# Patient Record
Sex: FEMALE | Race: WHITE | Employment: UNEMPLOYED | URBAN - METROPOLITAN AREA
[De-identification: names, ages, dates, MRNs, and addresses within clinical notes are randomized per-mention and may not be internally consistent; named-entity substitution may affect disease eponyms.]

---

## 2024-08-13 ENCOUNTER — OFFICE VISIT (OUTPATIENT)
Dept: INTERNAL MEDICINE CLINIC | Facility: CLINIC | Age: 69
End: 2024-08-13
Payer: COMMERCIAL

## 2024-08-13 VITALS
DIASTOLIC BLOOD PRESSURE: 72 MMHG | WEIGHT: 152 LBS | OXYGEN SATURATION: 98 % | SYSTOLIC BLOOD PRESSURE: 112 MMHG | HEART RATE: 80 BPM | HEIGHT: 60 IN | BODY MASS INDEX: 29.84 KG/M2

## 2024-08-13 DIAGNOSIS — Z82.61 FAMILY HISTORY OF RHEUMATOID ARTHRITIS: ICD-10-CM

## 2024-08-13 DIAGNOSIS — M25.50 POLYARTHRALGIA: ICD-10-CM

## 2024-08-13 DIAGNOSIS — Z12.31 SCREENING MAMMOGRAM, ENCOUNTER FOR: ICD-10-CM

## 2024-08-13 DIAGNOSIS — M19.049 HAND ARTHRITIS: ICD-10-CM

## 2024-08-13 DIAGNOSIS — Z98.61 CAD S/P PERCUTANEOUS CORONARY ANGIOPLASTY: ICD-10-CM

## 2024-08-13 DIAGNOSIS — Z78.0 ASYMPTOMATIC MENOPAUSE: ICD-10-CM

## 2024-08-13 DIAGNOSIS — M54.9 BACK PAIN, UNSPECIFIED BACK LOCATION, UNSPECIFIED BACK PAIN LATERALITY, UNSPECIFIED CHRONICITY: ICD-10-CM

## 2024-08-13 DIAGNOSIS — Z12.11 SCREENING FOR COLON CANCER: ICD-10-CM

## 2024-08-13 DIAGNOSIS — Z80.0 FAMILY HISTORY OF COLON CANCER: ICD-10-CM

## 2024-08-13 DIAGNOSIS — Z12.39 ENCOUNTER FOR BREAST CANCER SCREENING OTHER THAN MAMMOGRAM: ICD-10-CM

## 2024-08-13 DIAGNOSIS — Z00.00 ANNUAL PHYSICAL EXAM: Primary | ICD-10-CM

## 2024-08-13 DIAGNOSIS — I25.10 CAD S/P PERCUTANEOUS CORONARY ANGIOPLASTY: ICD-10-CM

## 2024-08-13 DIAGNOSIS — Z23 NEED FOR VACCINATION: ICD-10-CM

## 2024-08-13 DIAGNOSIS — Z12.39 ENCOUNTER FOR SCREENING FOR MALIGNANT NEOPLASM OF BREAST, UNSPECIFIED SCREENING MODALITY: ICD-10-CM

## 2024-08-13 DIAGNOSIS — J45.20 MILD INTERMITTENT ASTHMA WITHOUT COMPLICATION (HCC): ICD-10-CM

## 2024-08-13 PROCEDURE — 99387 INIT PM E/M NEW PAT 65+ YRS: CPT | Performed by: INTERNAL MEDICINE

## 2024-08-13 PROCEDURE — 90471 IMMUNIZATION ADMIN: CPT | Performed by: INTERNAL MEDICINE

## 2024-08-13 PROCEDURE — 3074F SYST BP LT 130 MM HG: CPT | Performed by: INTERNAL MEDICINE

## 2024-08-13 PROCEDURE — 3008F BODY MASS INDEX DOCD: CPT | Performed by: INTERNAL MEDICINE

## 2024-08-13 PROCEDURE — 3078F DIAST BP <80 MM HG: CPT | Performed by: INTERNAL MEDICINE

## 2024-08-13 PROCEDURE — 90750 HZV VACC RECOMBINANT IM: CPT | Performed by: INTERNAL MEDICINE

## 2024-08-13 RX ORDER — CLOPIDOGREL BISULFATE 75 MG/1
75 TABLET ORAL DAILY
COMMUNITY
Start: 2024-05-28 | End: 2024-08-15

## 2024-08-13 RX ORDER — MONTELUKAST SODIUM 10 MG/1
10 TABLET ORAL NIGHTLY
COMMUNITY
End: 2024-08-15

## 2024-08-13 RX ORDER — DESLORATADINE 5 MG/1
5 TABLET ORAL DAILY
COMMUNITY

## 2024-08-13 RX ORDER — DILTIAZEM HYDROCHLORIDE 60 MG/1
60 CAPSULE, EXTENDED RELEASE ORAL 2 TIMES DAILY
COMMUNITY

## 2024-08-13 RX ORDER — ROSUVASTATIN CALCIUM 20 MG/1
20 TABLET, COATED ORAL DAILY
COMMUNITY
End: 2024-08-15

## 2024-08-13 RX ORDER — IBUPROFEN 100 MG/5ML
1 SUSPENSION ORAL ONCE
Qty: 1 KIT | Refills: 2 | Status: SHIPPED | OUTPATIENT
Start: 2024-08-13 | End: 2024-08-13

## 2024-08-13 RX ORDER — ASPIRIN 81 MG/1
81 TABLET ORAL DAILY
COMMUNITY
Start: 2022-12-05 | End: 2024-08-15

## 2024-08-13 RX ORDER — ALBUTEROL SULFATE 90 UG/1
1 AEROSOL, METERED RESPIRATORY (INHALATION) EVERY 4 HOURS PRN
COMMUNITY

## 2024-08-13 NOTE — PROGRESS NOTES
Terrell Heard is a 68 year old female.    Chief complaint: annual physical exam       HPI:     Terrell Heard is a 68 year old pleasant female who presents for annual physical exam     Asthma   Albuterol   Trellegy   Also on Singulair   Asthma is under control   Not needing albuterol             CAD s/p angioplasty   May 2024   On aspirin, plavix and statin   Managed by the cardiologist Dr Martinez       Her sister has RA   Recently started having hand pain   Stiffness   Changes in the shape of her hands   Left is worse than the right   Swelling in the joints   Lower back pain   Djd   Did see Dr lopes   S/p injections x 3   S/p PT   Pain is on and off   When bad It is 9/10         Left  second toe   Bending   Painful       Last pap smear 2020  Did see OB gyne last year for postmenopause bleeding s/p endometrial biopsy         S/p colonscopy 2020           Family history of cancer : colon cancer   No smoking   No alcohol         Current Outpatient Medications   Medication Sig Dispense Refill    aspirin 81 MG Oral Tab EC Take 1 tablet (81 mg total) by mouth daily.      clopidogrel 75 MG Oral Tab Take 1 tablet (75 mg total) by mouth daily.      GLUCOSAMINE-VITAMIN D OR Glucosamine-Vitamin D 1000-200 MG-UNIT Oral Tablet RxNorm: 138198 Glucosamine-Vitamin D 1000-200 MG-UNIT 2022-12-05 Not Indicated   Dr Yoshi Martinez  [facility]  12/12/2022      rosuvastatin 20 MG Oral Tab Take 1 tablet (20 mg total) by mouth daily.      montelukast 10 MG Oral Tab Take 1 tablet (10 mg total) by mouth nightly.      desloratadine 5 MG Oral Tab Take 1 tablet (5 mg total) by mouth daily.      dilTIAZem HCl ER 60 MG Oral Capsule SR 12 Hr Take 1 capsule (60 mg total) by mouth 2 (two) times daily.      albuterol 108 (90 Base) MCG/ACT Inhalation Aero Soln Inhale 1 puff into the lungs every 4 (four) hours as needed.        Past Medical History:    Asthma (HCC)     Past Surgical History:   Procedure  Laterality Date    Angioplasty (coronary)  2024             Family History   Problem Relation Age of Onset    Stroke Father     Hypertension Father     Diabetes Mother     Cancer Sister         colon cancer     There is no problem list on file for this patient.      REVIEW OF SYSTEMS:   A comprehensive 10 point review of systems was completed.  Pertinent positives and negatives noted in the the HPI            EXAM:   /72   Pulse 80   Ht 5' (1.524 m)   Wt 152 lb (68.9 kg)   SpO2 98%   BMI 29.69 kg/m²   GENERAL: well developed, well nourished,in no apparent distress  SKIN: no rashes,no suspicious lesions  HEENT: atraumatic, normocephalic,ears and throat are clear  NECK: supple,no adenopathy  LUNGS: clear to auscultation  Breast : normal no lumps   CARDIO: RRR without murmur  GI: no masses, HSM or tenderness  EXTREMITIES: arthritis changes, deviation of the hand   Left second toe : hammer toe   Bilateral bunions   NEURO: no gross deficits              Orders Placed This Encounter    aspirin 81 MG Oral Tab EC     Sig: Take 1 tablet (81 mg total) by mouth daily.    clopidogrel 75 MG Oral Tab     Sig: Take 1 tablet (75 mg total) by mouth daily.    rosuvastatin 20 MG Oral Tab     Sig: Take 1 tablet (20 mg total) by mouth daily.    montelukast 10 MG Oral Tab     Sig: Take 1 tablet (10 mg total) by mouth nightly.    GLUCOSAMINE-VITAMIN D OR     Sig: Glucosamine-Vitamin D 1000-200 MG-UNIT Oral Tablet RxNorm: 137146 Glucosamine-Vitamin D 1000-200 MG-UNIT 2022-12-05 Not Indicated   Dr Yoshi Martinez  [facility]  12/12/2022    desloratadine 5 MG Oral Tab     Sig: Take 1 tablet (5 mg total) by mouth daily.    dilTIAZem HCl ER 60 MG Oral Capsule SR 12 Hr     Sig: Take 1 capsule (60 mg total) by mouth 2 (two) times daily.    albuterol 108 (90 Base) MCG/ACT Inhalation Aero Soln     Sig: Inhale 1 puff into the lungs every 4 (four) hours as needed.     No results found.         ASSESSMENT  AND PLAN:       ICD-10-CM    1. Annual physical exam  Z00.00 aspirin 81 MG Oral Tab EC     clopidogrel 75 MG Oral Tab     rosuvastatin 20 MG Oral Tab     montelukast 10 MG Oral Tab     GLUCOSAMINE-VITAMIN D OR     desloratadine 5 MG Oral Tab     dilTIAZem HCl ER 60 MG Oral Capsule SR 12 Hr     albuterol 108 (90 Base) MCG/ACT Inhalation Aero Soln     EDGAR RICARDO 2D+3D SCREENING BILAT (CPT=77067/76717)     Gastro Referral - In Network     XR DEXA BONE DENSITOMETRY (CPT=77080)     Zoster Recombinant Adjuvanted (Shingrix -Shingles) [39010]     CBC With Differential With Platelet     Blood Glucose Monitoring Suppl (CONTOUR NEXT GEN MONITOR) w/Device Does not apply Kit     Hemoglobin A1C     Lipid Panel     TSH W Reflex To Free T4     Vitamin B12     Folic Acid Serum (Folate)     Vitamin D     C-Reactive Protein     Cyclic Citrullinate Pep. IGG     Rheumatoid Arthritis Factor     Sed Rate, Westergren (Automated)     Connective Tissue Disease (MEL) Screen, Reflex Specific Antibody     Podiatry Referral - In Network     HLA B 27 Disease Association [E]     Physical Therapy Referral - [facility]     Rheumatology Referral - In Network     Urinalysis with Culture Reflex [E]     Comp Metabolic Panel (14) [E]      2. Encounter for screening for malignant neoplasm of breast, unspecified screening modality  Z12.39 Gastro Referral - In Network     CBC With Differential With Platelet     Blood Glucose Monitoring Suppl (CONTOUR NEXT GEN MONITOR) w/Device Does not apply Kit     Hemoglobin A1C     Lipid Panel     TSH W Reflex To Free T4     Vitamin B12     Folic Acid Serum (Folate)     Vitamin D     C-Reactive Protein     Cyclic Citrullinate Pep. IGG     Rheumatoid Arthritis Factor     Sed Rate, Westergren (Automated)     Connective Tissue Disease (MEL) Screen, Reflex Specific Antibody     Podiatry Referral - In Network     HLA B 27 Disease Association [E]     Physical Therapy Referral - [facility]     Rheumatology  Referral - In Network     Urinalysis with Culture Reflex [E]     Comp Metabolic Panel (14) [E]      3. Encounter for breast cancer screening other than mammogram  Z12.39 EDGAR RICARDO 2D+3D SCREENING BILAT (CPT=77067/90999)     CBC With Differential With Platelet     Blood Glucose Monitoring Suppl (CONTOUR NEXT GEN MONITOR) w/Device Does not apply Kit     Hemoglobin A1C     Lipid Panel     TSH W Reflex To Free T4     Vitamin B12     Folic Acid Serum (Folate)     Vitamin D     C-Reactive Protein     Cyclic Citrullinate Pep. IGG     Rheumatoid Arthritis Factor     Sed Rate, Westergren (Automated)     Connective Tissue Disease (MEL) Screen, Reflex Specific Antibody     Podiatry Referral - In Network     HLA B 27 Disease Association [E]     Physical Therapy Referral - [facility]     Rheumatology Referral - In [facility]     Urinalysis with Culture Reflex [E]     Comp Metabolic Panel (14) [E]      4. Screening mammogram, encounter for  Z12.31 EDGAR RICARDO 2D+3D SCREENING BILAT (CPT=77067/73529)     CBC With Differential With Platelet     Blood Glucose Monitoring Suppl (CONTOUR NEXT GEN MONITOR) w/Device Does not apply Kit     Hemoglobin A1C     Lipid Panel     TSH W Reflex To Free T4     Vitamin B12     Folic Acid Serum (Folate)     Vitamin D     C-Reactive Protein     Cyclic Citrullinate Pep. IGG     Rheumatoid Arthritis Factor     Sed Rate, Westergren (Automated)     Connective Tissue Disease (MEL) Screen, Reflex Specific Antibody     Podiatry Referral - In Network     HLA B 27 Disease Association [E]     Physical Therapy Referral - [facility]     Rheumatology Referral - In Network     Urinalysis with Culture Reflex [E]     Comp Metabolic Panel (14) [E]      5. Asymptomatic menopause  Z78.0 XR DEXA BONE DENSITOMETRY (CPT=77080)     CBC With Differential With Platelet     Blood Glucose Monitoring Suppl (CONTOUR NEXT GEN MONITOR) w/Device Does not apply Kit     Hemoglobin A1C     Lipid Panel     TSH W Reflex To Free  T4     Vitamin B12     Folic Acid Serum (Folate)     Vitamin D     C-Reactive Protein     Cyclic Citrullinate Pep. IGG     Rheumatoid Arthritis Factor     Sed Rate, Westergren (Automated)     Connective Tissue Disease (MEL) Screen, Reflex Specific Antibody     Podiatry Referral - In Network     HLA B 27 Disease Association [E]     Physical Therapy Referral - [facility]     Rheumatology Referral - In [facility]     Urinalysis with Culture Reflex [E]     Comp Metabolic Panel (14) [E]      6. Polyarthralgia  M25.50 CBC With Differential With Platelet     Blood Glucose Monitoring Suppl (CONTOUR NEXT GEN MONITOR) w/Device Does not apply Kit     Hemoglobin A1C     Lipid Panel     TSH W Reflex To Free T4     Vitamin B12     Folic Acid Serum (Folate)     Vitamin D     C-Reactive Protein     Cyclic Citrullinate Pep. IGG     Rheumatoid Arthritis Factor     Sed Rate, Westergren (Automated)     Connective Tissue Disease (MEL) Screen, Reflex Specific Antibody     Podiatry Referral - In [facility] 27 Disease Association [E]     Physical Therapy Referral - [facility]     Rheumatology Referral - In [facility]     Urinalysis with Culture Reflex [E]     Comp Metabolic Panel (14) [E]      7. Hand arthritis  M19.049 CBC With Differential With Platelet     Blood Glucose Monitoring Suppl (CONTOUR NEXT GEN MONITOR) w/Device Does not apply Kit     Hemoglobin A1C     Lipid Panel     TSH W Reflex To Free T4     Vitamin B12     Folic Acid Serum (Folate)     Vitamin D     C-Reactive Protein     Cyclic Citrullinate Pep. IGG     Rheumatoid Arthritis Factor     Sed Rate, Westergren (Automated)     Connective Tissue Disease (MEL) Screen, Reflex Specific Antibody     Podiatry Referral - In [facility] B 27 Disease Association [E]     Physical Therapy Referral - [facility]     Rheumatology Referral - In [facility]     Urinalysis with Culture Reflex [E]     Comp Metabolic Panel (14) [E]      8. CAD S/P percutaneous coronary  angioplasty  I25.10 CBC With Differential With Platelet    Z98.61 Blood Glucose Monitoring Suppl (CONTOUR NEXT GEN MONITOR) w/Device Does not apply Kit     Hemoglobin A1C     Lipid Panel     TSH W Reflex To Free T4     Vitamin B12     Folic Acid Serum (Folate)     Vitamin D     C-Reactive Protein     Cyclic Citrullinate Pep. IGG     Rheumatoid Arthritis Factor     Sed Rate, Westergren (Automated)     Connective Tissue Disease (MEL) Screen, Reflex Specific Antibody     Podiatry Referral - In Network     HLA B 27 Disease Association [E]     Physical Therapy Referral - [facility]     Rheumatology Referral - In Network     Urinalysis with Culture Reflex [E]     Comp Metabolic Panel (14) [E]      9. Family history of colon cancer  Z80.0 Gastro Referral - In Network     CBC With Differential With Platelet     Blood Glucose Monitoring Suppl (CONTOUR NEXT GEN MONITOR) w/Device Does not apply Kit     Hemoglobin A1C     Lipid Panel     TSH W Reflex To Free T4     Vitamin B12     Folic Acid Serum (Folate)     Vitamin D     C-Reactive Protein     Cyclic Citrullinate Pep. IGG     Rheumatoid Arthritis Factor     Sed Rate, Westergren (Automated)     Connective Tissue Disease (MEL) Screen, Reflex Specific Antibody     Podiatry Referral - In Network     HLA B 27 Disease Association [E]     Physical Therapy Referral - [facility]     Rheumatology Referral - In Network     Urinalysis with Culture Reflex [E]     Comp Metabolic Panel (14) [E]      10. Family history of rheumatoid arthritis  Z82.61 CBC With Differential With Platelet     Blood Glucose Monitoring Suppl (CONTOUR NEXT GEN MONITOR) w/Device Does not apply Kit     Hemoglobin A1C     Lipid Panel     TSH W Reflex To Free T4     Vitamin B12     Folic Acid Serum (Folate)     Vitamin D     C-Reactive Protein     Cyclic Citrullinate Pep. IGG     Rheumatoid Arthritis Factor     Sed Rate, Westergren (Automated)     Connective Tissue Disease (MEL) Screen, Reflex  Specific Antibody     Podiatry Referral - In Network     HLA B 27 Disease Association [E]     Physical Therapy Referral - [facility]     Rheumatology Referral - In Network     Urinalysis with Culture Reflex [E]     Comp Metabolic Panel (14) [E]      11. Back pain, unspecified back location, unspecified back pain laterality, unspecified chronicity  M54.9        Diet and exercise   Self breast exam   Sun screen recommended   Fasting blood work   Advised to get second shingrex vaccine today   Mammogram   DEXA scan   Referral to GI for screening colonoscopy since with family history of colon cancer : colonoscopy every 5 years   Will check cbc , cmp, tsh , esr , crp, simona , RF , CCP , HLAb27   Offered starting prednisone for pain after getting the blood work she would like to hold off   Referral to rheumatology   Referral to podiatry   Referral  to PT   Follow up with the cardiologist     Please return to the clinic if you are having persistent symptoms. If worsening symptoms should go to the ER    Alexa Porter MD,   Diplomate of the American Board of Internal Medicine  Diplomate of the American Board of Obesity Medicine

## 2024-08-15 ENCOUNTER — LAB ENCOUNTER (OUTPATIENT)
Dept: LAB | Facility: REFERENCE LAB | Age: 69
End: 2024-08-15
Attending: INTERNAL MEDICINE
Payer: COMMERCIAL

## 2024-08-15 DIAGNOSIS — Z00.00 ANNUAL PHYSICAL EXAM: ICD-10-CM

## 2024-08-15 DIAGNOSIS — Z12.39 ENCOUNTER FOR SCREENING FOR MALIGNANT NEOPLASM OF BREAST, UNSPECIFIED SCREENING MODALITY: ICD-10-CM

## 2024-08-15 DIAGNOSIS — Z12.39 ENCOUNTER FOR BREAST CANCER SCREENING OTHER THAN MAMMOGRAM: ICD-10-CM

## 2024-08-15 DIAGNOSIS — M19.049 HAND ARTHRITIS: ICD-10-CM

## 2024-08-15 DIAGNOSIS — Z78.0 ASYMPTOMATIC MENOPAUSE: ICD-10-CM

## 2024-08-15 DIAGNOSIS — Z80.0 FAMILY HISTORY OF COLON CANCER: ICD-10-CM

## 2024-08-15 DIAGNOSIS — M25.50 POLYARTHRALGIA: ICD-10-CM

## 2024-08-15 DIAGNOSIS — I25.10 CAD S/P PERCUTANEOUS CORONARY ANGIOPLASTY: ICD-10-CM

## 2024-08-15 DIAGNOSIS — Z98.61 CAD S/P PERCUTANEOUS CORONARY ANGIOPLASTY: ICD-10-CM

## 2024-08-15 DIAGNOSIS — Z12.31 SCREENING MAMMOGRAM, ENCOUNTER FOR: ICD-10-CM

## 2024-08-15 DIAGNOSIS — Z82.61 FAMILY HISTORY OF RHEUMATOID ARTHRITIS: ICD-10-CM

## 2024-08-15 LAB
ALBUMIN SERPL-MCNC: 4.6 G/DL (ref 3.2–4.8)
ALBUMIN/GLOB SERPL: 1.6 {RATIO} (ref 1–2)
ALP LIVER SERPL-CCNC: 89 U/L
ALT SERPL-CCNC: 19 U/L
ANION GAP SERPL CALC-SCNC: 6 MMOL/L (ref 0–18)
AST SERPL-CCNC: 21 U/L (ref ?–34)
BASOPHILS # BLD AUTO: 0.03 X10(3) UL (ref 0–0.2)
BASOPHILS NFR BLD AUTO: 0.5 %
BILIRUB SERPL-MCNC: 0.4 MG/DL (ref 0.2–1.1)
BILIRUB UR QL: NEGATIVE
BUN BLD-MCNC: 10 MG/DL (ref 9–23)
BUN/CREAT SERPL: 17.9 (ref 10–20)
CALCIUM BLD-MCNC: 9.6 MG/DL (ref 8.7–10.4)
CHLORIDE SERPL-SCNC: 109 MMOL/L (ref 98–112)
CHOLEST SERPL-MCNC: 166 MG/DL (ref ?–200)
CO2 SERPL-SCNC: 28 MMOL/L (ref 21–32)
CREAT BLD-MCNC: 0.56 MG/DL
CRP SERPL-MCNC: 4.1 MG/DL (ref ?–1)
DEPRECATED RDW RBC AUTO: 41.2 FL (ref 35.1–46.3)
EGFRCR SERPLBLD CKD-EPI 2021: 99 ML/MIN/1.73M2 (ref 60–?)
EOSINOPHIL # BLD AUTO: 0.55 X10(3) UL (ref 0–0.7)
EOSINOPHIL NFR BLD AUTO: 8.9 %
ERYTHROCYTE [DISTWIDTH] IN BLOOD BY AUTOMATED COUNT: 12.6 % (ref 11–15)
ERYTHROCYTE [SEDIMENTATION RATE] IN BLOOD: 25 MM/HR
EST. AVERAGE GLUCOSE BLD GHB EST-MCNC: 117 MG/DL (ref 68–126)
FASTING PATIENT LIPID ANSWER: YES
FASTING STATUS PATIENT QL REPORTED: YES
FOLATE SERPL-MCNC: 13.7 NG/ML (ref 5.4–?)
GLOBULIN PLAS-MCNC: 2.9 G/DL (ref 2–3.5)
GLUCOSE BLD-MCNC: 99 MG/DL (ref 70–99)
GLUCOSE UR-MCNC: NORMAL MG/DL
HBA1C MFR BLD: 5.7 % (ref ?–5.7)
HCT VFR BLD AUTO: 41.4 %
HDLC SERPL-MCNC: 80 MG/DL (ref 40–59)
HGB BLD-MCNC: 14.3 G/DL
IMM GRANULOCYTES # BLD AUTO: 0.01 X10(3) UL (ref 0–1)
IMM GRANULOCYTES NFR BLD: 0.2 %
KETONES UR-MCNC: NEGATIVE MG/DL
LDLC SERPL CALC-MCNC: 71 MG/DL (ref ?–100)
LEUKOCYTE ESTERASE UR QL STRIP.AUTO: 500
LYMPHOCYTES # BLD AUTO: 1.94 X10(3) UL (ref 1–4)
LYMPHOCYTES NFR BLD AUTO: 31.5 %
MCH RBC QN AUTO: 30.8 PG (ref 26–34)
MCHC RBC AUTO-ENTMCNC: 34.5 G/DL (ref 31–37)
MCV RBC AUTO: 89 FL
MONOCYTES # BLD AUTO: 0.58 X10(3) UL (ref 0.1–1)
MONOCYTES NFR BLD AUTO: 9.4 %
NEUTROPHILS # BLD AUTO: 3.05 X10 (3) UL (ref 1.5–7.7)
NEUTROPHILS # BLD AUTO: 3.05 X10(3) UL (ref 1.5–7.7)
NEUTROPHILS NFR BLD AUTO: 49.5 %
NITRITE UR QL STRIP.AUTO: NEGATIVE
NONHDLC SERPL-MCNC: 86 MG/DL (ref ?–130)
OSMOLALITY SERPL CALC.SUM OF ELEC: 295 MOSM/KG (ref 275–295)
PH UR: 5 [PH] (ref 5–8)
PLATELET # BLD AUTO: 177 10(3)UL (ref 150–450)
POTASSIUM SERPL-SCNC: 4.6 MMOL/L (ref 3.5–5.1)
PROT SERPL-MCNC: 7.5 G/DL (ref 5.7–8.2)
PROT UR-MCNC: NEGATIVE MG/DL
RBC # BLD AUTO: 4.65 X10(6)UL
RHEUMATOID FACT SERPL-ACNC: 10.6 IU/ML (ref ?–14)
SODIUM SERPL-SCNC: 143 MMOL/L (ref 136–145)
SP GR UR STRIP: 1.02 (ref 1–1.03)
TRIGL SERPL-MCNC: 78 MG/DL (ref 30–149)
TSI SER-ACNC: 0.82 MIU/ML (ref 0.55–4.78)
UROBILINOGEN UR STRIP-ACNC: NORMAL
VIT B12 SERPL-MCNC: 365 PG/ML (ref 211–911)
VIT D+METAB SERPL-MCNC: 15.6 NG/ML (ref 30–100)
VLDLC SERPL CALC-MCNC: 12 MG/DL (ref 0–30)
WBC # BLD AUTO: 6.2 X10(3) UL (ref 4–11)
WBC #/AREA URNS AUTO: >50 /HPF

## 2024-08-15 PROCEDURE — 83036 HEMOGLOBIN GLYCOSYLATED A1C: CPT

## 2024-08-15 PROCEDURE — 36415 COLL VENOUS BLD VENIPUNCTURE: CPT

## 2024-08-15 PROCEDURE — 82607 VITAMIN B-12: CPT

## 2024-08-15 PROCEDURE — 85025 COMPLETE CBC W/AUTO DIFF WBC: CPT

## 2024-08-15 PROCEDURE — 86038 ANTINUCLEAR ANTIBODIES: CPT

## 2024-08-15 PROCEDURE — 80061 LIPID PANEL: CPT

## 2024-08-15 PROCEDURE — 86200 CCP ANTIBODY: CPT

## 2024-08-15 PROCEDURE — 82306 VITAMIN D 25 HYDROXY: CPT

## 2024-08-15 PROCEDURE — 81374 HLA I TYPING 1 ANTIGEN LR: CPT

## 2024-08-15 PROCEDURE — 84443 ASSAY THYROID STIM HORMONE: CPT

## 2024-08-15 PROCEDURE — 85652 RBC SED RATE AUTOMATED: CPT

## 2024-08-15 PROCEDURE — 86140 C-REACTIVE PROTEIN: CPT

## 2024-08-15 PROCEDURE — 80053 COMPREHEN METABOLIC PANEL: CPT

## 2024-08-15 PROCEDURE — 86225 DNA ANTIBODY NATIVE: CPT

## 2024-08-15 PROCEDURE — 82746 ASSAY OF FOLIC ACID SERUM: CPT

## 2024-08-15 PROCEDURE — 86431 RHEUMATOID FACTOR QUANT: CPT

## 2024-08-15 PROCEDURE — 87086 URINE CULTURE/COLONY COUNT: CPT

## 2024-08-15 PROCEDURE — 81001 URINALYSIS AUTO W/SCOPE: CPT

## 2024-08-15 RX ORDER — ASPIRIN 81 MG/1
81 TABLET ORAL DAILY
Qty: 90 TABLET | Refills: 3 | Status: SHIPPED | OUTPATIENT
Start: 2024-08-15 | End: 2024-11-13

## 2024-08-15 RX ORDER — MONTELUKAST SODIUM 10 MG/1
10 TABLET ORAL NIGHTLY
Qty: 90 TABLET | Refills: 3 | Status: SHIPPED | OUTPATIENT
Start: 2024-08-15 | End: 2024-08-19

## 2024-08-15 RX ORDER — CLOPIDOGREL BISULFATE 75 MG/1
75 TABLET ORAL DAILY
Qty: 90 TABLET | Refills: 3 | Status: SHIPPED | OUTPATIENT
Start: 2024-08-15 | End: 2024-11-13

## 2024-08-15 RX ORDER — ROSUVASTATIN CALCIUM 20 MG/1
20 TABLET, COATED ORAL DAILY
Qty: 90 TABLET | Refills: 3 | Status: SHIPPED | OUTPATIENT
Start: 2024-08-15 | End: 2024-08-19

## 2024-08-15 RX ORDER — CARBAMAZEPINE 100 MG/1
100 TABLET, EXTENDED RELEASE ORAL DAILY
Qty: 90 TABLET | Refills: 3 | Status: SHIPPED | OUTPATIENT
Start: 2024-08-15 | End: 2024-11-13

## 2024-08-17 LAB
CCP IGG SERPL-ACNC: 1.1 U/ML (ref 0–6.9)
DSDNA IGG SERPL IA-ACNC: 0.9 IU/ML
ENA AB SER QL IA: 0.2 UG/L
ENA AB SER QL IA: NEGATIVE
HLA B27 SCREENING: NEGATIVE

## 2024-08-19 ENCOUNTER — TELEPHONE (OUTPATIENT)
Dept: INTERNAL MEDICINE CLINIC | Facility: CLINIC | Age: 69
End: 2024-08-19

## 2024-08-19 DIAGNOSIS — Z00.00 ANNUAL PHYSICAL EXAM: ICD-10-CM

## 2024-08-19 DIAGNOSIS — R82.90 ABNORMAL URINALYSIS: Primary | ICD-10-CM

## 2024-08-19 RX ORDER — ROSUVASTATIN CALCIUM 20 MG/1
20 TABLET, COATED ORAL DAILY
Qty: 90 TABLET | Refills: 3 | Status: SHIPPED | OUTPATIENT
Start: 2024-08-19 | End: 2024-11-17

## 2024-08-19 RX ORDER — MONTELUKAST SODIUM 10 MG/1
10 TABLET ORAL NIGHTLY
Qty: 90 TABLET | Refills: 3 | Status: SHIPPED | OUTPATIENT
Start: 2024-08-19 | End: 2024-11-17

## 2024-08-19 NOTE — TELEPHONE ENCOUNTER
Please call pharmacy the 50,000 vitamin d is an error   The correct dose is 5000 U vitamin d daily

## 2024-09-11 DIAGNOSIS — M25.541 ARTHRALGIA OF BOTH HANDS: Primary | ICD-10-CM

## 2024-09-11 DIAGNOSIS — M25.542 ARTHRALGIA OF BOTH HANDS: Primary | ICD-10-CM

## 2024-09-11 DIAGNOSIS — M25.649 STIFFNESS OF HAND JOINT, UNSPECIFIED LATERALITY: ICD-10-CM

## 2024-09-12 ENCOUNTER — HOSPITAL ENCOUNTER (OUTPATIENT)
Dept: GENERAL RADIOLOGY | Age: 69
Discharge: HOME | End: 2024-09-12
Attending: INTERNAL MEDICINE
Payer: COMMERCIAL

## 2024-09-12 DIAGNOSIS — M25.542 ARTHRALGIA OF BOTH HANDS: ICD-10-CM

## 2024-09-12 DIAGNOSIS — M25.649 STIFFNESS OF HAND JOINT, UNSPECIFIED LATERALITY: ICD-10-CM

## 2024-09-12 DIAGNOSIS — M25.541 ARTHRALGIA OF BOTH HANDS: ICD-10-CM

## 2024-09-12 PROCEDURE — 73130 X-RAY EXAM OF HAND: CPT | Performed by: INTERNAL MEDICINE

## 2024-09-17 ENCOUNTER — HOSPITAL ENCOUNTER (OUTPATIENT)
Dept: MAMMOGRAPHY | Age: 69
Discharge: HOME | End: 2024-09-17
Attending: INTERNAL MEDICINE
Payer: COMMERCIAL

## 2024-09-17 DIAGNOSIS — Z12.39 ENCOUNTER FOR BREAST CANCER SCREENING OTHER THAN MAMMOGRAM: ICD-10-CM

## 2024-09-17 DIAGNOSIS — Z12.31 SCREENING MAMMOGRAM, ENCOUNTER FOR: ICD-10-CM

## 2024-09-17 DIAGNOSIS — Z00.00 ANNUAL PHYSICAL EXAM: ICD-10-CM

## 2024-09-17 PROCEDURE — 77063 BREAST TOMOSYNTHESIS BI: CPT | Performed by: INTERNAL MEDICINE

## 2024-09-17 PROCEDURE — 77067 SCR MAMMO BI INCL CAD: CPT | Performed by: INTERNAL MEDICINE

## 2024-09-27 ENCOUNTER — HOSPITAL ENCOUNTER (OUTPATIENT)
Dept: BONE DENSITY | Age: 69
Discharge: HOME | End: 2024-09-27
Attending: INTERNAL MEDICINE
Payer: COMMERCIAL

## 2024-09-27 DIAGNOSIS — Z78.0 ASYMPTOMATIC MENOPAUSE: ICD-10-CM

## 2024-09-27 DIAGNOSIS — Z00.00 ANNUAL PHYSICAL EXAM: ICD-10-CM

## 2024-09-27 PROCEDURE — 77080 DXA BONE DENSITY AXIAL: CPT | Performed by: INTERNAL MEDICINE

## 2024-10-01 ENCOUNTER — OFFICE VISIT (OUTPATIENT)
Dept: ORTHOPEDICS CLINIC | Facility: CLINIC | Age: 69
End: 2024-10-01
Payer: COMMERCIAL

## 2024-10-01 ENCOUNTER — HOSPITAL ENCOUNTER (OUTPATIENT)
Dept: GENERAL RADIOLOGY | Age: 69
Discharge: HOME | End: 2024-10-01
Attending: PODIATRIST
Payer: COMMERCIAL

## 2024-10-01 VITALS — WEIGHT: 149.94 LBS | HEIGHT: 60 IN | BODY MASS INDEX: 29.44 KG/M2

## 2024-10-01 DIAGNOSIS — M79.672 LEFT FOOT PAIN: Primary | ICD-10-CM

## 2024-10-01 DIAGNOSIS — M20.41 HAMMER TOES OF BOTH FEET: ICD-10-CM

## 2024-10-01 DIAGNOSIS — M21.619 BUNION: ICD-10-CM

## 2024-10-01 DIAGNOSIS — M79.672 LEFT FOOT PAIN: ICD-10-CM

## 2024-10-01 DIAGNOSIS — M20.42 HAMMER TOES OF BOTH FEET: ICD-10-CM

## 2024-10-01 PROCEDURE — 3008F BODY MASS INDEX DOCD: CPT | Performed by: PODIATRIST

## 2024-10-01 PROCEDURE — 99203 OFFICE O/P NEW LOW 30 MIN: CPT | Performed by: PODIATRIST

## 2024-10-01 PROCEDURE — 73630 X-RAY EXAM OF FOOT: CPT | Performed by: PODIATRIST

## 2024-10-01 NOTE — PROGRESS NOTES
[facility] New Patient Note    CC:   Chief Complaint   Patient presents with    Foot Pain     Lt hammer toe, onset: 1yr ago  Pt only has pain when wearing shoes ; she has no pain barefoot  Pt denies numb and ting         HPI: The patient is a 68 year old female who presents today with complaints of a painful hammertoe of toe left foot.  This has been for some time.  She has tried to change shoe gear and get some relief.  She is not really interested in surgery but would like to hear about it.  She is here with her  who is translating for her          Past Medical History:    Asthma (HCC)     Past Surgical History:   Procedure Laterality Date    Angioplasty (coronary)  2024     Current Outpatient Medications   Medication Sig Dispense Refill    montelukast 10 MG Oral Tab Take 1 tablet (10 mg total) by mouth nightly. 90 tablet 3    rosuvastatin 20 MG Oral Tab Take 1 tablet (20 mg total) by mouth daily. 90 tablet 3    cholecalciferol 125 MCG (5000 UT) Oral Tab Take 1 tablet (5,000 Units total) by mouth daily. 90 tablet 1    aspirin 81 MG Oral Tab EC Take 1 tablet (81 mg total) by mouth daily. 90 tablet 3    clopidogrel 75 MG Oral Tab Take 1 tablet (75 mg total) by mouth daily. 90 tablet 3    carBAMazepine  MG Oral Tablet 12 Hr Take 1 tablet (100 mg total) by mouth daily. 90 tablet 3    GLUCOSAMINE-VITAMIN D OR Glucosamine-Vitamin D 1000-200 MG-UNIT Oral Tablet RxNorm: 165162 Glucosamine-Vitamin D 1000-200 MG-UNIT 2022-12-05 Not Indicated   Dr Yoshi Martinez  [facility]  12/12/2022      desloratadine 5 MG Oral Tab Take 1 tablet (5 mg total) by mouth daily.      dilTIAZem HCl ER 60 MG Oral Capsule SR 12 Hr Take 1 capsule (60 mg total) by mouth 2 (two) times daily.      albuterol 108 (90 Base) MCG/ACT Inhalation Aero Soln Inhale 1 puff into the lungs every 4 (four) hours as needed.       No Known Allergies  Family History   Problem Relation Age of Onset    Stroke  Father     Hypertension Father     Diabetes Mother     Cancer Sister         colon cancer     Social History     Occupational History    Not on file   Tobacco Use    Smoking status: Never    Smokeless tobacco: Never   Vaping Use    Vaping status: Never Used   Substance and Sexual Activity    Alcohol use: Never    Drug use: Never    Sexual activity: Not on file        ROS:  Complete ROS reviewed by me and non-contributory to the chief complaint except as mentioned above.    Physical Exam:    Ht 5' (1.524 m)   Wt 149 lb 14.6 oz (68 kg)   BMI 29.28 kg/m²       Exam she has mild hypermobility of the foot in the forefoot.  She has advanced bunion deformities with increased hallux abductus and crowding of the second toes bilateral.  She has enlarged medial eminence with mild bursitis and redness at the first MP joint.  She has full nonpainful range of motion of the first MP joint with hypermobility.  Crowding especially of the left foot second digit with a semirigid hammertoe PIP joint with a mild corn.  Early second digit overlapping syndrome  Sensation is intact sharp versus dull.  She can feel light touch to the tips of the toes  Palpable pedal pulses.  Hair growth is present.  Skin is supple and feet are well perfused and warm.    Strength is 5 out of 5 all muscle groups    Full range of motion and nonpainful motion of the ankle joint, subtalar joint, MP joints, and midfoot joints.     Imaging: Increased intermetatarsal angle with increased hallux abductus angle  Hammertoe second digit noted   personally viewed, independently interpreted and radiology report read.      Assessment/Diagnoses:  Diagnoses and all orders for this visit:    Left foot pain  -     XR FOOT, COMPLETE (MIN 3 VIEWS), LEFT (CPT=73630); Future    Hammer toes of both feet    Bunion        Plan:  I reviewed imaging and exam findings with the patient and her .  We went over the x-rays together and discussed what a bunion is and also what a  hammertoe is.  The hammertoe is aggravated by the bunion even though her bunion is not painful.  We discussed surgical and nonsurgical treatments  Padding and spacers may feel good but they are not going to prevent any worsening.  This is a hereditary issue  Talked about shoes and where to get shoes different types of shoes that she can try with a high and wide toe box    Recommend following up yearly for reevaluation.  Brief discussion on surgical options but they want to try nonsurgical for now which is fine.  I think because of her hypermobility she will probably maintain her balance fairly well      Leila Block, NEETUM  [facility]      This document was partially prepared using Dragon Medical voice recognition software.

## 2024-10-28 ENCOUNTER — NURSE ONLY (OUTPATIENT)
Facility: CLINIC | Age: 69
End: 2024-10-28

## 2024-10-28 DIAGNOSIS — K52.839 MICROSCOPIC COLITIS, UNSPECIFIED MICROSCOPIC COLITIS TYPE: Primary | ICD-10-CM

## 2024-10-28 DIAGNOSIS — Z12.11 SCREEN FOR COLON CANCER: ICD-10-CM

## 2024-10-28 DIAGNOSIS — Z80.0 FAMILY HISTORY OF COLON CANCER: ICD-10-CM

## 2024-10-28 NOTE — PROGRESS NOTES
Called patient for scheduled TCS.   Medications, pharmacy, and allergies reviewed.   Please advise on colonoscopy and bowel prep orders.     Age 45-74 y/o:   › MD preference: HEAVEN  › Insurance:  [facility] HMO  › Last PCP visit: 08/13/2024 Dr Porter  Pcp within [facility]:  › Last CBC: 08/15/2024  › H/W/BMI: 5'/149/29.28    Special comments/notes: Pt's  is a retired MD. Their son is her cardiologist Dr Yoshi Martinez. Pt had her last colonoscopy in [place] 3 years ago. She was experiencing unintentional weight loss and having chronic diarrhea. The biopsy showed microscopic lymphocytic colitis. Pt only uses Imodium for her symptoms but is doing much better and has put on weight.     Telephone Colon Screening Questionnaire Yes No   Are you currently experiencing any new/abnormal GI symptoms? [] [x]   If yes, explain:     Rectal bleeding? [] [x]   Black stool? [] [x]   Dysphagia or food \"feeling stuck\" when eating? [] [x]   Intractable vomiting? [] [x]   Unexplained weight loss? [] [x]   First colonoscopy? [] [x]   Family history of colon cancer? Sister [x] []   Any issues with anesthesia? [] [x]   If yes, explain:      Any recent complaints of chest pain or shortness of breath?  [] [x]   Referred to a cardiologist? Pt's son is her cardiologist Dr Yoshi Martinez [] []   If yes, explain:      Stroke, MI (heart attack) or stent placement in the last 12 months:  [] [x]   History of  respiratory issues/oxygen/AMY/COPD: [] [x]   If yes, CPAP/BiPAP:     History of devices (pacemaker/defibrillator) Stents [x] []   History of cardiac/CVA/(MI/stroke): [] [x]     Medications Yes  No   Anticoagulants (except Aspirin):  Plavix [] []   Diabetic Meds  PO: Hold day before and day of procedure  Insulin:  [] [x]   Weight loss meds (phentermine/vyvanse/saxsenda/etc): [] [x]   Iron/herbal/multivitamin supplement: VitaminD [] []   Usage of marijuana, CBD &/or vape products: [] [x]

## 2024-11-01 RX ORDER — SODIUM, POTASSIUM,MAG SULFATES 17.5-3.13G
SOLUTION, RECONSTITUTED, ORAL ORAL
Qty: 1 EACH | Refills: 0 | Status: SHIPPED | OUTPATIENT
Start: 2024-11-01

## 2024-11-01 NOTE — PROGRESS NOTES
Thanks all.    PCP Alexa Porter MD annual physical exam office visit 8/13/2024 reviewed.  CAD s/p angioplasty   May 2024   On aspirin, plavix and statin       Plavix patient    Family history colon cancer in a sibling    As below, \"Pt had her last colonoscopy in [place] 3 years ago. She was experiencing unintentional weight loss and having chronic diarrhea. The biopsy showed microscopic lymphocytic colitis.  Pt only uses Imodium for her symptoms but is doing much better and has put on weight. \"    GI schedulers -    Please schedule colonoscopy exam at [facility] ([facility])    BMI Readings from Last 1 Encounters:   10/01/24 29.28 kg/m²     MAC anesthesia     BP Readings from Last 5 Encounters:   08/13/24 112/72     Suprep small volume bowel prep if covered by insurance, otherwise   Golytely (PEG) 4L bowel prep  [facility]      DX = screening colonoscopy examination, history of microscopic colitis, family history colon cancer in a sibling    Medication instructions:    Standard orders for blood thinners:  Hold Plavix/clopidogrel 4 days prior to procedure

## 2024-11-04 NOTE — PROGRESS NOTES
Scheduled for:  Colonoscopy 77950  Provider Name:     Date:  Friday, 04/18/2025  Location:  [facility]  Sedation:  MAC  Time:  210PM (pt is aware Endo will call with arrival time     Prep:  Suprep   Meds/Allergies Reconciled?:  Yes    Diagnosis with codes:   screening colonoscopy examination Z12.11 history of microscopic colitis, K52.839 family history colon cancer in a sibling Z80.0      Was patient informed to call insurance with codes (Y/N):  Yes     Referral sent?:  Referral was sent at the time of electronic surgical scheduling.    [facility] or [facility] notified?:  I sent an electronic request to Endo Scheduling and received a confirmation today.       Medication Orders:  Standard orders for blood thinners:  Hold Plavix/clopidogrel 4 days prior to procedure  Misc Orders:  None     Further instructions given by staff:  I discussed the prep instructions with the patient which the patient  verbally understood and is aware that I will send the instructions today.via [employer]

## 2024-11-05 ENCOUNTER — PATIENT MESSAGE (OUTPATIENT)
Dept: INTERNAL MEDICINE CLINIC | Facility: CLINIC | Age: 69
End: 2024-11-05

## 2024-11-08 RX ORDER — ALBUTEROL SULFATE 90 UG/1
2 INHALANT RESPIRATORY (INHALATION) EVERY 6 HOURS PRN
Qty: 18 G | Refills: 0 | Status: SHIPPED | OUTPATIENT
Start: 2024-11-08

## 2024-11-08 RX ORDER — FLUTICASONE FUROATE, UMECLIDINIUM BROMIDE AND VILANTEROL TRIFENATATE 200; 62.5; 25 UG/1; UG/1; UG/1
1 POWDER RESPIRATORY (INHALATION) DAILY
Qty: 180 EACH | Refills: 1 | Status: SHIPPED | OUTPATIENT
Start: 2024-11-08 | End: 2025-02-06

## 2024-11-29 ENCOUNTER — HOSPITAL ENCOUNTER (OUTPATIENT)
Dept: GENERAL RADIOLOGY | Facility: HOSPITAL | Age: 69
Discharge: HOME | End: 2024-11-29
Attending: INTERNAL MEDICINE
Payer: COMMERCIAL

## 2024-11-29 ENCOUNTER — OFFICE VISIT (OUTPATIENT)
Dept: RHEUMATOLOGY | Facility: CLINIC | Age: 69
End: 2024-11-29
Payer: COMMERCIAL

## 2024-11-29 VITALS
HEIGHT: 60 IN | BODY MASS INDEX: 29.25 KG/M2 | WEIGHT: 149 LBS | HEART RATE: 70 BPM | RESPIRATION RATE: 16 BRPM | SYSTOLIC BLOOD PRESSURE: 123 MMHG | DIASTOLIC BLOOD PRESSURE: 74 MMHG

## 2024-11-29 DIAGNOSIS — M79.641 BILATERAL HAND PAIN: ICD-10-CM

## 2024-11-29 DIAGNOSIS — M79.642 BILATERAL HAND PAIN: ICD-10-CM

## 2024-11-29 DIAGNOSIS — M54.50 CHRONIC BILATERAL LOW BACK PAIN WITHOUT SCIATICA: ICD-10-CM

## 2024-11-29 DIAGNOSIS — M25.50 POLYARTHRALGIA: Primary | ICD-10-CM

## 2024-11-29 DIAGNOSIS — G89.29 CHRONIC BILATERAL LOW BACK PAIN WITHOUT SCIATICA: ICD-10-CM

## 2024-11-29 DIAGNOSIS — M25.50 POLYARTHRALGIA: ICD-10-CM

## 2024-11-29 PROCEDURE — 3074F SYST BP LT 130 MM HG: CPT | Performed by: INTERNAL MEDICINE

## 2024-11-29 PROCEDURE — 3078F DIAST BP <80 MM HG: CPT | Performed by: INTERNAL MEDICINE

## 2024-11-29 PROCEDURE — 3008F BODY MASS INDEX DOCD: CPT | Performed by: INTERNAL MEDICINE

## 2024-11-29 PROCEDURE — 72110 X-RAY EXAM L-2 SPINE 4/>VWS: CPT | Performed by: INTERNAL MEDICINE

## 2024-11-29 PROCEDURE — 99204 OFFICE O/P NEW MOD 45 MIN: CPT | Performed by: INTERNAL MEDICINE

## 2024-11-29 NOTE — PATIENT INSTRUCTIONS
You were seen today for joint pain mostly in your hands, feet and lower spine  It does seem that your symptoms are more consistent with osteoarthritis  X-rays of your hand and feet show a lot of osteoarthritis findings  Your inflammation marker CRP was high  Try occupational physical therapy  Get x-ray of the lower spine  Repeat inflammation marker in the next month  Get MRI of the right hand

## 2024-11-29 NOTE — PROGRESS NOTES
Terrell Heard is a 68 year old female who presents for No chief complaint on file.  .   HPI:   CC: joint pain   Consult: referred by PCP Dr. Alexa Porter     This is a 67 yo F with hx of HLD, CAD s/p angioplasty May 2024 (on Plavix and ASA), Asthma referred for polyarthralgia's.  She reports b/l hand pain for the past 6 mos, left worse than right. Hard to a fist which is new. The right 4th finger also gets stuck at times.   Has morning stiffness in the hands but also consistent throughout the day. She uses Voltaren gel as needed which helps but does not last.   Now can't wear rings as bones in hands have changed.  Also has pain in the knees  Has chronic foot pain and has hammer toes and bunion.   Having pain in left dorsal aspect of the foot.  Has lower back pain marciano when sleeping and getting up from a seated position. Has had lower back for about 3 years. She had an MRI of lower spine in 2022 and was seen by pain mgmt and had 2 PUJA and PT and did help the pain.  PT did help the lower pain.   No rash or psoriasis.  Her sister has RA.  She takes tylenol as needed for pain. She also takes naproxen 220 mg BID which helped slightly. Took for 12 days and now off of it.   She had x-rays of the hands showing generalized osteopenia and moderate degenerative changes in the first CMC joints and moderate to severe bilateral distal interphalangeal joint arthritis.  X-ray of the left foot was also done showing moderate to severe hallux valgus deformity with osteoarthritis at the first MTP and bunion formation.  She was seen by podiatry.    Blood work:  Neg CTD screen, dsDNA  Neg ESR, RF, CCP, HLA-B27  CRP 4.1 mg/dL      Wt Readings from Last 2 Encounters:   10/01/24 149 lb 14.6 oz (68 kg)   08/13/24 152 lb (68.9 kg)     There is no height or weight on file to calculate BMI.      Current Outpatient Medications   Medication Sig Dispense Refill    fluticasone-umeclidin-vilant (TRELEGY ELLIPTA) 200-62.5-25 MCG/ACT Inhalation  Aerosol Powder, Breath Activated Inhale 1 puff into the lungs daily. 180 each 1    albuterol 108 (90 Base) MCG/ACT Inhalation Aero Soln Inhale 2 puffs into the lungs every 6 (six) hours as needed for Wheezing or Shortness of Breath. 18 g 0    Na Sulfate-K Sulfate-Mg Sulf (SUPREP BOWEL PREP KIT) 17.5-3.13-1.6 GM/177ML Oral Solution Take as directed 1 each 0    GLUCOSAMINE-VITAMIN D OR Glucosamine-Vitamin D 1000-200 MG-UNIT Oral Tablet RxNorm: [id number] Glucosamine-Vitamin D 1000-200 MG-UNIT 2022-12-05 Not Indicated   Dr Yoshi Martinez  [facility]  12/12/2022        Past Medical History:    Asthma (HCC)      Past Surgical History:   Procedure Laterality Date    Angioplasty (coronary)  2024      Family History   Problem Relation Age of Onset    Stroke Father     Hypertension Father     Diabetes Mother     Cancer Sister         colon cancer      Social History:  Social History     Socioeconomic History    Marital status:    Tobacco Use    Smoking status: Never    Smokeless tobacco: Never   Vaping Use    Vaping status: Never Used   Substance and Sexual Activity    Alcohol use: Never    Drug use: Never           REVIEW OF SYSTEMS:   Review Of Systems:  Constitutional: No fever, no change in weight or appetitie  Derm: No rashes, no oral ulcers, no alopecia, no photosensitivity, no psoriasis  HEENT: No dry eyes, no dry mouth, no Raynaud's, no nasal ulcers, no parotid swelling, no neck pain, no jaw pain, no temple pain  Eyes: No visual changes,   CVS: No chest pain, no heart disease  RS: No SOB, no Cough, No Pleurtic pain,   GI: No nausea, no vomiiting, no abominal pain, no hx of ulcer, no gastritis, no heartburn, no dyshpagia, no BRBPR or melena  : no dysuria, no hx of miscarriages, no DVT Hx, no hx of OCP,   Neuro: No numbness or tingling, no headache, no hx of seizures,   Psych: no hx of anxiety or depression  ENDO: no hx of thyroid disease, no hx of DM  Joint/Muscluskeltal: see HPI,    All other ROS are negative.     EXAM:   There were no vitals taken for this visit.  GEN: AAOx3, NAD  HEENT: EOMI, PERRLA, no injection or icterus, oral mucosa moist, no oral lesions. No lymphadenopathy. No facial rash  CVS: RRR, no murmurs rubs or gallops. Equal 2+ distal pulses.   LUNGS: CTAB, no increased work of breathing  ABDOMEN:  soft NT/ND, +BS, no HSM  SKIN: No rashes or skin lesions. No nail findings  MSK:  Heberden notes bilaterally  Left 4th A-1 pulley TTP  Not able to fully flex right 4th finger  NEURO: Cranial nerves II-XII intact grossly. 5/5 strength throughout in both upper and lower extremities, sensation intact.  PSYCH: normal mood    LABS:     Component      Latest Ref Rng 8/15/2024   Expanded VANDANA Antibody Screen, IGG      <0.7 ug/l 0.20    Anti-dsDNA antibody      <10 IU/mL 0.9    Connective Tissue Disease Screen Interpretation      Negative  Negative    HLA-B27 Screening Negative    HLA-B27 Interpretation *    Reviewed By SEE COMMENT    C-REACTIVE PROTEIN      <1.00 mg/dL 4.10 (H)    C-Citrullinated Peptide IgG AB      0.0 - 6.9 U/mL 1.1    RHEUMATOID FACTOR      <14.0 IU/mL 10.6    SED RATE      0 - 30 mm/Hr 25         IMAGING:     XR b/l hands 9/2024:  No acute fracture or dislocation.   Bilateral multifocal osteoarthritis including bilateral for a CMC osteoarthritis and moderate to severe bilateral distal interphalangeal joint osteoarthritis.   Osteopenia.     XR left foot:  No acute fracture or dislocation.  There is moderate to severe hallux valgus deformity with osteoarthritis at the 1st MTP joint and bunion formation.  There is moderate to severe osteoarthritis within the interphalangeal joints.  Tiny calcaneal plantar   and Achilles enthesophytes are present.  No radiopaque foreign body.     ASSESSMENT AND PLAN:     Polyarthralgia's, mostly B/L hand pain  - She has evidence of Heberden nodes bilaterally.  Also tenosynovitis of the left fourth finger  - Blood work shows negative RF and  CCP.  CRP was elevated  - X-rays of the hand showing evidence of osteoarthritis  - Plan to repeat inflammation markers  - She would like to try occupational therapy  - Plan to also get MRI of the left hand for further evaluation  - She will continue Tylenol as needed.  Avoid NSAIDs as she is on aspirin and Plavix    Bilateral foot pain  - She has hammertoes and bunions  - Following with podiatry    Chronic lower back pain  - She had MRI of her lower spine in 2022.  She was following with pain management.  - She had steroid injections in her lower spine in the past  - Back pain worsening, will obtain x-ray of the lower spine  - refer to PT    Thank you for allowing me to participate in this patients care. Pt will f/u in 3 mos     Wen Brian MD  11/29/2024  12:25 PM

## 2024-12-03 ENCOUNTER — TELEPHONE (OUTPATIENT)
Dept: PHYSICAL THERAPY | Facility: HOSPITAL | Age: 69
End: 2024-12-03

## 2024-12-05 ENCOUNTER — OFFICE VISIT (OUTPATIENT)
Dept: PHYSICAL THERAPY | Age: 69
End: 2024-12-05
Attending: INTERNAL MEDICINE
Payer: COMMERCIAL

## 2024-12-05 ENCOUNTER — TELEPHONE (OUTPATIENT)
Dept: PHYSICAL THERAPY | Facility: HOSPITAL | Age: 69
End: 2024-12-05

## 2024-12-05 DIAGNOSIS — M79.641 BILATERAL HAND PAIN: Primary | ICD-10-CM

## 2024-12-05 DIAGNOSIS — G89.29 CHRONIC BILATERAL LOW BACK PAIN WITHOUT SCIATICA: ICD-10-CM

## 2024-12-05 DIAGNOSIS — M79.642 BILATERAL HAND PAIN: Primary | ICD-10-CM

## 2024-12-05 DIAGNOSIS — M54.50 CHRONIC BILATERAL LOW BACK PAIN WITHOUT SCIATICA: ICD-10-CM

## 2024-12-05 PROCEDURE — 97162 PT EVAL MOD COMPLEX 30 MIN: CPT | Performed by: PHYSICAL THERAPIST

## 2024-12-05 PROCEDURE — 97110 THERAPEUTIC EXERCISES: CPT | Performed by: PHYSICAL THERAPIST

## 2024-12-05 NOTE — PROGRESS NOTES
LUMBAR SPINE EVALUATION:   Referring Physician:  KATHARINE Brian MD  Diagnosis:  Bilateral hand pain (M79.641,M79.642)  Chronic bilateral low back pain without sciatica (M54.50,G89.29)   Date of Service: 12/5/2024   Date of Onset: 11/29/2024 (Rx date)    PATIENT SUMMARY   Terrell Heard is a 68 year old y/o female who presents to therapy today with complaints of intermittent  pain/ache in the (R) lower back, lateral thigh, calf, and foot rated 0-10/10.  She denies any numbness or tingling at this time.  Terrell lives with her  and does normal daily home chores/activities and cooking.  She likes to walk, shop, and watch TV/read sitting on a couch.  She rides an SUV with her  driving to do errands, go to activities, and appointments. They also travel to [place] 2x/year.      History of current condition: Terrell report that she started feeling (R) lower back and leg symptoms started to hurt about 2 years ago for no apparent reason. Lately she is has been feeling tightness/ache in the lateral calf and foot.  She has PT last year for the lower back which made her better but it came back after 2-3 months and had 3 cortisone shots which reduced her symptoms but it came back. She is now referred to physical therapy for evaluation and treatment.       Previous history:  CAD stent May 2024; (B) Inguinal hernia; Asthma.    Current functional limitations reported include the inability to bend, sit, rise up from sitting, stand > walk, get in/out of their vehicle, pick something form the floor, and stoop forward to do the dishes.     Patient would like to be able to return to their prior level of function     ASSESSMENT:   Terrell is presenting with a (R) lumbar spine unilateral below knee derangement with a directional preference toward lower lumbar extension.  Her lumbar mobility increased toward extension and (R/L) SG without production of symptoms after this directional preference exercise.  Explained to  Terrell the importance of performing her directional preference exercise consistently to have a good carryover of her improved symptoms.  Discussed the role of posture correction in sitting using a lumbar roll to augment her reduced symptoms.   Terrell Heard will be re-assessed next session and will be progressed or modified according to her presentation.  She understood and agreed with the plan of care.  All questions and concerns were answered and addressed at this time.        Terrell would benefit from skilled Physical Therapy to address the above impairments.      Precautions: None     OBJECTIVE:   Observation/Posture:   Slouched, forward head, protruded shoulders, reduced lumbar lordosis.    Gait:   Normal no assistive device.    ROM: (Pre-test symptom: 0/10 (R) lower back and leg)  Lumbar        Movement Loss Pain (+/-)   Flexion Nil loss                     NE   Extension Moderate loss NE stiff   R Sideglide Minimal loss P, NW 1/10 mid back   L Sideglide Minimal loss P, NW     Repeated motion testing/other tests/Treatment:  TARSHA x 10 reps; P, NW (better) + lean over rail x 10 reps; NE (increased lumbar extension to nil loss).  Pronelying x 1 min; NE.  ANTONIO x 1 min; NE.  REIL x 10 reps; NE back (tight (R) hip) (increased (R/L) SG to nil loss).  Sitting posture correction with lumbar roll on a supportive chair.    Today’s Treatment and Response:  Patient instructed and educated on lumbar mechanics, directional preference exercise, centralization of symptoms, goal setting with patient,  posture correction, and HEP.    HEP: Patient was instructed in and issued a HEP for TARSHA or REIL x 10 reps 4-5x/day and pronelying to ANTONIO 1-2 mins before sleeping at night.  Posture correction using a lumbar roll (recommended) on supportive chair.     Charges: PT Eval (Mod) x 1, TherEx x 1    Total Timed treatment: 15 min      Total Treatment Time: 46 min        PLAN OF CARE:    Goals: (5 visits)  1. Patient to  consistently perform her HEP and it's progression to maintain her improved condition.   2. Patient to have reduced, centralized, and abolished symptoms in the low back to enable easier ADLs, functional, work, and recreational activities.   3. Patient to have WNL of lumbar mobility in all directions to be able to bend, sit, rise up from sitting, stand > walk, get in/out of their vehicle, pick something form the floor, and stoop forward to do the dishes.     4. Patient to consistently have good posture to promote her symptomfree condition.     Patient was advised of these findings, precautions, and treatment options and has agreed to actively participate in planning/goal setting for this course of care.    Frequency / Duration: Patient will be seen for 2-1x/week or a total of 5 visits over a 90 day period.      Treatment will include: Directional preference exercises; Therapeutic Exercises; Neuromuscular Re-education; Posture correction; Patient education; HEP progression.    Education or treatment limitation: None  Rehab Potential:good    In agreement with functional assessment testing score and clinical rationale, this evaluation involved Moderate Complexity decision making due to 1-2 personal factors/comorbidities, 4+ body structures involved/activity limitations, and evolving symptoms including changing pain levels.    JENNIFER Score: Initial:  34    Patient/Family () was advised of these findings, precautions, and treatment options and has agreed to actively participate in planning and for this course of care.    Thank you for your referral. Please co-sign or sign and return this letter via fax as soon as possible to [phone]. If you have any questions, please contact me at Dept: [phone]    Sincerely,  Electronically signed by therapist: AIME DASH PT Cert. MDT    Physician's certification required: Yes  I certify the need for these services furnished under this plan of treatment and while under  my care.    X___________________________________________________ Date____________________    Certification From: 12/5/2024  To:3/5/2025

## 2024-12-05 NOTE — PATIENT INSTRUCTIONS
Patient was instructed in and issued a HEP for TARSHA or REIL x 10 reps 4-5x/day and pronelying to ANTONIO 1-2 mins before sleeping at night.  Posture correction using a lumbar roll (recommended) on supportive chair.

## 2024-12-10 ENCOUNTER — OFFICE VISIT (OUTPATIENT)
Dept: PHYSICAL THERAPY | Age: 69
End: 2024-12-10
Attending: INTERNAL MEDICINE
Payer: COMMERCIAL

## 2024-12-10 PROCEDURE — 97110 THERAPEUTIC EXERCISES: CPT | Performed by: PHYSICAL THERAPIST

## 2024-12-10 NOTE — PROGRESS NOTES
Diagnosis:  Bilateral hand pain (M79.641,M79.642)  Chronic bilateral low back pain without sciatica (M54.50,G89.29)        Referring Provider:  KATHARINE Brian MD Date of Evaluation:  12/5/2024    Precautions:  None Date POC Expires: 3/5/2024     Date of Surgery: NA    Next MD visit:   none scheduled     Today's Date: 12/10/2024    Insurance Primary/Secondary[place] [employer] [place] [facility] [employer]  Authorized Visits: 5     Visit Number: 2    Charges: TherEx x 3  Total Timed Treatment: 47 min  Total Treatment time: 48 min    Medication Changes since last visit?: No    Subjective:   Terrell report that her back is feeling better.  She does not have any symptoms at this time and she can bend more forward and side to side (Sideglides) without symptoms and looser.  She has been doing her HEP (TARSHA) 4-5x/day.  She is also using a lumbar roll when she is sitting.  She is feeling tingling in the (L) UE.    Objective:   ROM: (Pre-test symptom: 0/10 (R) lower back and leg)  Lumbar        Movement Loss Pain (+/-)   Flexion Nil loss                     NE   Extension Minimal loss NE    R Sideglide Nil loss NE   L Sideglide Nil loss NE     Treatment:  Date: 12/10/2024  Tx#: 2/5 Date:   Tx#: 3/ Date:   Tx#: 4/ Date:   Tx#: 5/ Date:   Tx#: 6/ Date:   Tx#: 7/ Date:   Tx#: 8/   Scifit UE only L3 fwd/bkwd x 4 mins ea; NE back (tired breathing)    TARSHA x 10 reps; NE    + lean over rail x 10 reps; NE (increased extension to nil loss)    Sitting posture correction with lumbar with lumbar roll    C/s retraction x 10 reps; NE    + self OP x 10 reps; NE    Pronelying x 1 min; NE    Prone: Alt LE lift x 10 reps; P, NW (R) back    Prone: (B) UE extension 10 reps x 10 cts ea; NE    Prone: (B) UE h.abduction 3 reps x 10 cts; NE back; P, NW (R) shoulder ache (discontinued    Prone: back extension 10 reps x 10 cts; NE    Hydrant: (R/L) LE 1 kg ball 5 reps x 10 cts ea; NE     TARSHA over rail x 10 reps; NE           Assessment:   Terrell minor to  respond to lumbar extension directional preference exercise with increase in lumbar mobility in all directions without symptoms. Added lower cervical extension (retraction) to her HEP to relieve neck and (L) UE tingling.  She was tired after postural strengthening/stability exercises.  All questions and concerns were answered and addressed at this time.      HEP:    - TARSHA over rail/counter/table x 10 reps 2-3x/day  - Seated: c/s retraction with self OP x 10 reps 2-3x/day  - Sitting posture correction with lumbar roll    Goals:   (5 visits)  1. Patient to consistently perform her HEP and it's progression to maintain her improved condition.   2. Patient to have reduced, centralized, and abolished symptoms in the low back to enable easier ADLs, functional, work, and recreational activities.   3. Patient to have WNL of lumbar mobility in all directions to be able to bend, sit, rise up from sitting, stand > walk, get in/out of their vehicle, pick something form the floor, and stoop forward to do the dishes.     Plan:   Re-assess next session and continue with directional preference exercise, posture correction, postural strengthening, patient education, and HEP progression.

## 2024-12-12 ENCOUNTER — OFFICE VISIT (OUTPATIENT)
Dept: PHYSICAL THERAPY | Age: 69
End: 2024-12-12
Attending: INTERNAL MEDICINE
Payer: COMMERCIAL

## 2024-12-12 PROCEDURE — 97110 THERAPEUTIC EXERCISES: CPT | Performed by: PHYSICAL THERAPIST

## 2024-12-12 NOTE — PROGRESS NOTES
Diagnosis:  Bilateral hand pain (M79.641,M79.642)  Chronic bilateral low back pain without sciatica (M54.50,G89.29)        Referring Provider:  KATHARINE Brian MD Date of Evaluation:  12/5/2024    Precautions:  None Date POC Expires: 3/5/2024     Date of Surgery: NA    Next MD visit:   none scheduled     Today's Date: 12/12/2024    Ins[place] [employer] [place] [employer] [employer]  Authorized Visits: 5     Visit Number: 3    Charges: TherEx x 3  Total Timed Treatment: 48 min  Total Treatment time: 49 min    Medication Changes since last visit?: No    Subjective:   Terrell report that she does not have any back pain.  Her neck is also better, she only feel an ache in the middle of the spine.  She has been doing her HEP for the back and neck regularly.     Objective:   ROM: (Pre-test symptom: 0/10 (R) lower back and leg)  Lumbar        Movement Loss Pain (+/-)   Flexion Nil loss                     NE   Extension Nil loss NE    R Sideglide Nil loss NE   L Sideglide Nil loss NE     Treatment:  Date: 12/10/2024  Tx#: 2/5 Date: 12/12/2024  Tx#: 3/5 Date:   Tx#: 4/ Date:   Tx#: 5/   Scifit UE only L3 fwd/bkwd x 4 mins ea; NE back (tired breathing)    TARSHA x 10 reps; NE    + lean over rail x 10 reps; NE (increased extension to nil loss)    Sitting posture correction with lumbar with lumbar roll    C/s retraction x 10 reps; NE    + self OP x 10 reps; NE    Pronelying x 1 min; NE    Prone: Alt LE lift x 10 reps; P, NW (R) back    Prone: (B) UE extension 10 reps x 10 cts ea; NE    Prone: (B) UE h.abduction 3 reps x 10 cts; NE back; P, NW (R) shoulder ache (discontinued    Prone: back extension 10 reps x 10 cts; NE    Hydrant: (R/L) LE 1 kg ball 5 reps x 10 cts ea; NE     TARSHA over rail x 10 reps; NE Scifit UE only L3 fwd/bkwd x 4 mins ea; NE     TARSHA lean over rail x 10 reps; NE     Sitting posture correction with lumbar roll    C/s retraction with self OP x 10 reps; NE (need to correct technique)     + manual  OP/guide x 5 reps; P, NW mid lower neck    Repeat c/s retraction with self OP x 10 reps; NE (better technique)    Prone: Alt LE lift 2 lbs x 10 reps; P, NW (L) back weakness    Prone: (B) UE extension 2 lbs 10 reps x 10 cts ea; NE    Prone: (B) UE h.abduction 1 lb 10 reps x 10 cts; NE     Prone: (B) UE scaption (W position) 10 reps x 5 cts ea; NE    Prone: back extension (skydiver) 7 reps x 10 cts; NE    Hydrant: (R/L) LE 1 kg ball 5 reps x 10 cts ea; NE     Monster walk with redTB abduction resist 3 laps x 20 feet; NE tired    TARSHA over rail x 10 reps; NE       Assessment:   Terrell continue to respond to lumbar extension directional preference exercise.  She is progressing with postural strengthening/stability exercises in prone and standing positions.  Minimal cueing needed to correct posture, alignment, and technique.  Reinforced the importance of posture correction in sitting using a lumbar roll/support.  All questions and concerns were answered and addressed at this time.      HEP:    - TARSHA over rail/counter/table x 10 reps 2-3x/day  - Seated: c/s extension lean back on chair x 10 reps 2-3x/day  - Sitting posture correction with lumbar roll    Goals:   (5 visits)  1. Patient to consistently perform her HEP and it's progression to maintain her improved condition.   2. Patient to have reduced, centralized, and abolished symptoms in the low back to enable easier ADLs, functional, work, and recreational activities.   3. Patient to have WNL of lumbar mobility in all directions to be able to bend, sit, rise up from sitting, stand > walk, get in/out of their vehicle, pick something form the floor, and stoop forward to do the dishes.     Plan:   Re-assess next session and continue with directional preference exercise, posture correction, postural strengthening, patient education, and HEP progression.

## 2024-12-16 ENCOUNTER — HOSPITAL ENCOUNTER (OUTPATIENT)
Dept: MRI IMAGING | Facility: HOSPITAL | Age: 69
Discharge: HOME | End: 2024-12-16
Attending: INTERNAL MEDICINE
Payer: COMMERCIAL

## 2024-12-16 DIAGNOSIS — M54.50 CHRONIC BILATERAL LOW BACK PAIN WITHOUT SCIATICA: ICD-10-CM

## 2024-12-16 DIAGNOSIS — G89.29 CHRONIC BILATERAL LOW BACK PAIN WITHOUT SCIATICA: ICD-10-CM

## 2024-12-16 DIAGNOSIS — M79.641 BILATERAL HAND PAIN: ICD-10-CM

## 2024-12-16 DIAGNOSIS — M79.642 BILATERAL HAND PAIN: ICD-10-CM

## 2024-12-16 PROCEDURE — A9575 INJ GADOTERATE MEGLUMI 0.1ML: HCPCS | Performed by: INTERNAL MEDICINE

## 2024-12-16 PROCEDURE — 73220 MRI UPPR EXTREMITY W/O&W/DYE: CPT | Performed by: INTERNAL MEDICINE

## 2024-12-16 RX ORDER — GADOTERATE MEGLUMINE 376.9 MG/ML
15 INJECTION INTRAVENOUS
Status: COMPLETED | OUTPATIENT
Start: 2024-12-16 | End: 2024-12-16

## 2024-12-16 RX ADMIN — GADOTERATE MEGLUMINE 13 ML: 376.9 INJECTION INTRAVENOUS at 14:52:00

## 2024-12-18 ENCOUNTER — APPOINTMENT (OUTPATIENT)
Dept: PHYSICAL THERAPY | Age: 69
End: 2024-12-18
Attending: INTERNAL MEDICINE

## 2024-12-18 ENCOUNTER — APPOINTMENT (OUTPATIENT)
Dept: PHYSICAL THERAPY | Age: 69
End: 2024-12-18
Attending: INTERNAL MEDICINE
Payer: COMMERCIAL

## 2024-12-19 ENCOUNTER — OFFICE VISIT (OUTPATIENT)
Dept: PHYSICAL THERAPY | Age: 69
End: 2024-12-19
Attending: SURGERY
Payer: COMMERCIAL

## 2024-12-19 PROCEDURE — 97110 THERAPEUTIC EXERCISES: CPT | Performed by: PHYSICAL THERAPIST

## 2024-12-19 NOTE — PROGRESS NOTES
Diagnosis:  Bilateral hand pain (M79.641,M79.642)  Chronic bilateral low back pain without sciatica (M54.50,G89.29)        Referring Provider:  KATHARINE Brian MD Date of Evaluation:  12/5/2024    Precautions:  None Date POC Expires: 3/5/2024     Date of Surgery: NA    Next MD visit:   none scheduled     Today's Date: 12/19/2024    Ins[place] [employer] [place] [facility] [facility]  Authorized Visits: 5     Visit Number: 4    Charges: TherEx x 3  Total Timed Treatment: 46 min  Total Treatment time: 47 min    Medication Changes since last visit?: No    Subjective:   Terrell report that she played bowling yesterday and her back did not bother her.  She has not played bowling for a longtime and she's happy that she can bowl again. She kept on doing her HEP (TARSHA) 3x/day and before and after bowling.  She still feel a tightness and weakness in the (R) lower back and leg when she vacuum and get out of her car.  It is better than before and improving still with the exercises she has been doing.  She is also watching her sitting posture more using a lumbar roll.     Objective:   ROM: (Pre-test symptom: 0/10 (R) lower back and leg)  Lumbar        Movement Loss Pain (+/-)   Flexion Nil loss                     NE   Extension Nil loss NE    R Sideglide Nil loss NE   L Sideglide Nil loss NE     Treatment:  Date: 12/10/2024  Tx#: 2/5 Date: 12/12/2024  Tx#: 3/5 Date: 12/19/2024  Tx#: 4/5 Date:   Tx#: 5/   Scifit UE only L3 fwd/bkwd x 4 mins ea; NE back (tired breathing)    TARSHA x 10 reps; NE    + lean over rail x 10 reps; NE (increased extension to nil loss)    Sitting posture correction with lumbar with lumbar roll    C/s retraction x 10 reps; NE    + self OP x 10 reps; NE    Pronelying x 1 min; NE    Prone: Alt LE lift x 10 reps; P, NW (R) back    Prone: (B) UE extension 10 reps x 10 cts ea; NE    Prone: (B) UE h.abduction 3 reps x 10 cts; NE back; P, NW (R) shoulder ache (discontinued    Prone: back extension 10  reps x 10 cts; NE    Hydrant: (R/L) LE 1 kg ball 5 reps x 10 cts ea; NE     TARSHA over rail x 10 reps; NE Scifit UE only L3 fwd/bkwd x 4 mins ea; NE     TARSHA lean over rail x 10 reps; NE     Sitting posture correction with lumbar roll    C/s retraction with self OP x 10 reps; NE (need to correct technique)     + manual OP/guide x 5 reps; P, NW mid lower neck    Repeat c/s retraction with self OP x 10 reps; NE (better technique)    Prone: Alt LE lift 2 lbs x 10 reps; P, NW (L) back weakness    Prone: (B) UE extension 2 lbs 10 reps x 10 cts ea; NE    Prone: (B) UE h.abduction 1 lb 10 reps x 10 cts; NE     Prone: (B) UE scaption (W position) 10 reps x 5 cts ea; NE    Prone: back extension (skydiver) 7 reps x 10 cts; NE    Hydrant: (R/L) LE 1 kg ball 5 reps x 10 cts ea; NE     Monster walk with redTB abduction resist 3 laps x 20 feet; NE tired    TARSHA over rail x 10 reps; NE Scifit UE only L3 fwd/bkwd x 4 mins ea; NE     TARSHA lean over rail x 10 reps; NE     (B) UE n.row, extension, w.row greenTB 10 reps x 10 cts ea; NE     Hydrant: (R/L) LE 1 kg ball 5 reps x 10 cts ea; NE     Monster walk with redTB abduction resist 3 laps x 20 feet; NE tired    TARSHA over rail x 10 reps; NE      Assessment:   Terrell continue to respond to lumbar extension directional preference exercise.  She has WNL of lumbar AROM in all directions without production of symptoms.  She is felt a tightness/discomfort in the (R) lower back during standing stability exercise to the (R) LE but no worse as a result.  All questions and concerns were answered and addressed at this time.      HEP:    - TARSHA over rail/counter/table x 10 reps 2-3x/day  - Seated: c/s extension lean back on chair x 10 reps 2-3x/day  - Sitting posture correction with lumbar roll    Goals:   (5 visits)  1. Patient to consistently perform her HEP and it's progression to maintain her improved condition.   2. Patient to have reduced, centralized, and abolished symptoms in the low  back to enable easier ADLs, functional, work, and recreational activities.   3. Patient to have WNL of lumbar mobility in all directions to be able to bend, sit, rise up from sitting, stand > walk, get in/out of their vehicle, pick something form the floor, and stoop forward to do the dishes.     Plan:   Re-assess next session and continue with directional preference exercise, posture correction, postural strengthening, patient education, and HEP progression.

## 2024-12-20 ENCOUNTER — APPOINTMENT (OUTPATIENT)
Dept: PHYSICAL THERAPY | Age: 69
End: 2024-12-20
Attending: SURGERY
Payer: COMMERCIAL

## 2024-12-20 ENCOUNTER — APPOINTMENT (OUTPATIENT)
Dept: PHYSICAL THERAPY | Age: 69
End: 2024-12-20
Attending: INTERNAL MEDICINE

## 2024-12-24 ENCOUNTER — APPOINTMENT (OUTPATIENT)
Dept: PHYSICAL THERAPY | Age: 69
End: 2024-12-24
Attending: INTERNAL MEDICINE

## 2024-12-27 ENCOUNTER — APPOINTMENT (OUTPATIENT)
Dept: PHYSICAL THERAPY | Age: 69
End: 2024-12-27
Attending: INTERNAL MEDICINE

## 2024-12-31 ENCOUNTER — APPOINTMENT (OUTPATIENT)
Dept: PHYSICAL THERAPY | Age: 69
End: 2024-12-31
Attending: INTERNAL MEDICINE

## 2025-01-02 ENCOUNTER — APPOINTMENT (OUTPATIENT)
Dept: PHYSICAL THERAPY | Age: 70
End: 2025-01-02
Attending: INTERNAL MEDICINE
Payer: COMMERCIAL

## 2025-01-03 ENCOUNTER — APPOINTMENT (OUTPATIENT)
Dept: PHYSICAL THERAPY | Age: 70
End: 2025-01-03
Attending: INTERNAL MEDICINE

## 2025-01-06 ENCOUNTER — APPOINTMENT (OUTPATIENT)
Dept: PHYSICAL THERAPY | Age: 70
End: 2025-01-06
Attending: INTERNAL MEDICINE

## 2025-01-07 ENCOUNTER — OFFICE VISIT (OUTPATIENT)
Dept: PHYSICAL THERAPY | Age: 70
End: 2025-01-07
Attending: INTERNAL MEDICINE
Payer: COMMERCIAL

## 2025-01-07 PROCEDURE — 97110 THERAPEUTIC EXERCISES: CPT | Performed by: PHYSICAL THERAPIST

## 2025-01-07 NOTE — PROGRESS NOTES
Diagnosis:  Bilateral hand pain (M79.641,M79.642)  Chronic bilateral low back pain without sciatica (M54.50,G89.29)        Referring Provider:  KATHARINE Brian MD Date of Evaluation:  12/5/2024    Precautions:  None Date POC Expires: 3/5/2024     Date of Surgery: NA    Next MD visit:   none scheduled     Today's Date: 1/7/2025    [place] [employer] [place] [facility]  Authorized Visits: 4 (2024) + 5 (2025) = 9   Visit Number: 5    Charges: TherEx x 3  Total Timed Treatment: 42 min  Total Treatment time: 43 min    Medication Changes since last visit?: No    Subjective:   Terrell report that she does not have pain/ache in the back at this time.  She at time would still feel an ache in the lower back when doing chores at home but it is less than before and goes away easily.   Objective:   ROM: (Pre-test symptom: 0/10 (R) lower back and leg)  Lumbar        Movement Loss Pain (+/-)   Flexion Nil loss                     NE   Extension Nil loss NE    R Sideglide Nil loss NE   L Sideglide Nil loss NE     Treatment:  Date: 12/10/2024  Tx#: 2/5 Date: 12/12/2024  Tx#: 3/5 Date: 12/19/2024  Tx#: 4/5 Date: 1/7/2025  Tx#: 5/5   Scifit UE only L3 fwd/bkwd x 4 mins ea; NE back (tired breathing)    TARSHA x 10 reps; NE    + lean over rail x 10 reps; NE (increased extension to nil loss)    Sitting posture correction with lumbar with lumbar roll    C/s retraction x 10 reps; NE    + self OP x 10 reps; NE    Pronelying x 1 min; NE    Prone: Alt LE lift x 10 reps; P, NW (R) back    Prone: (B) UE extension 10 reps x 10 cts ea; NE    Prone: (B) UE h.abduction 3 reps x 10 cts; NE back; P, NW (R) shoulder ache (discontinued    Prone: back extension 10 reps x 10 cts; NE    Hydrant: (R/L) LE 1 kg ball 5 reps x 10 cts ea; NE     TARSHA over rail x 10 reps; NE Scifit UE only L3 fwd/bkwd x 4 mins ea; NE     TARSHA lean over rail x 10 reps; NE     Sitting posture correction with lumbar roll    C/s retraction with self OP x 10  reps; NE (need to correct technique)     + manual OP/guide x 5 reps; P, NW mid lower neck    Repeat c/s retraction with self OP x 10 reps; NE (better technique)    Prone: Alt LE lift 2 lbs x 10 reps; P, NW (L) back weakness    Prone: (B) UE extension 2 lbs 10 reps x 10 cts ea; NE    Prone: (B) UE h.abduction 1 lb 10 reps x 10 cts; NE     Prone: (B) UE scaption (W position) 10 reps x 5 cts ea; NE    Prone: back extension (skydiver) 7 reps x 10 cts; NE    Hydrant: (R/L) LE 1 kg ball 5 reps x 10 cts ea; NE     Monster walk with redTB abduction resist 3 laps x 20 feet; NE tired    TARSHA over rail x 10 reps; NE Scifit UE only L3 fwd/bkwd x 4 mins ea; NE     TARSHA lean over rail x 10 reps; NE     (B) UE n.row, extension, w.row greenTB 10 reps x 10 cts ea; NE     Hydrant: (R/L) LE 1 kg ball 5 reps x 10 cts ea; NE     Monster walk with redTB abduction resist 3 laps x 20 feet; NE tired    TARSHA over rail x 10 reps; NE Scifit UE only L3 fwd/bkwd x 4 mins ea; NE     TARSHA lean over rail x 10 reps; NE     (B) UE n.row, extension, w.row greenTB 10 reps x 10 cts ea; NE     Hydrant: (R/L) LE 1 kg ball 5 reps x 10 cts ea; NE     Monster walk with redTB abduction resist 3 laps x 20 feet; NE tired    TARSHA over rail x 10 reps; NE     Assessment:   Terrell continue to respond to lumbar extension directional preference exercise. She did not report any pain/ache in the back during her session but felt pressure/strain in the leg/hip/thigh muscles during her session.  She is slowly progressing with (B) LE strengthening exercises. Issued blueTB for home use for postural strengthening. All questions and concerns were answered and addressed at this time.      HEP:    - TARSHA over rail/counter/table x 10 reps 2-3x/day  - Seated: c/s extension lean back on chair x 10 reps 2-3x/day  - Sitting posture correction with lumbar roll  - (B) Scapula/upper back strengthening (issued)    Goals:   (5 visits)  1. Patient to consistently perform her HEP and  it's progression to maintain her improved condition.   2. Patient to have reduced, centralized, and abolished symptoms in the low back to enable easier ADLs, functional, work, and recreational activities.   3. Patient to have WNL of lumbar mobility in all directions to be able to bend, sit, rise up from sitting, stand > walk, get in/out of their vehicle, pick something form the floor, and stoop forward to do the dishes.     Plan:   Re-assess next session and continue with directional preference exercise, posture correction, postural strengthening, patient education, and HEP progression.

## 2025-01-08 ENCOUNTER — APPOINTMENT (OUTPATIENT)
Dept: PHYSICAL THERAPY | Age: 70
End: 2025-01-08
Attending: INTERNAL MEDICINE

## 2025-01-09 ENCOUNTER — APPOINTMENT (OUTPATIENT)
Dept: PHYSICAL THERAPY | Age: 70
End: 2025-01-09
Attending: INTERNAL MEDICINE
Payer: COMMERCIAL

## 2025-01-10 ENCOUNTER — OFFICE VISIT (OUTPATIENT)
Dept: PHYSICAL THERAPY | Age: 70
End: 2025-01-10
Attending: INTERNAL MEDICINE
Payer: COMMERCIAL

## 2025-01-10 PROCEDURE — 97110 THERAPEUTIC EXERCISES: CPT | Performed by: PHYSICAL THERAPIST

## 2025-01-10 NOTE — PROGRESS NOTES
Diagnosis:  Bilateral hand pain (M79.641,M79.642)  Chronic bilateral low back pain without sciatica (M54.50,G89.29)        Referring Provider:  KATHARINE Brian MD Date of Evaluation:  12/5/2024    Precautions:  None Date POC Expires: 3/5/2024     Date of Surgery: NA    Next MD visit:   none scheduled     Today's Date: 1/10/2025    [place] [employer] [place] [employer]  Authorized Visits: 4 (2024) + 5 (2025) = 9   Visit Number: 6    Charges: TherEx x 3  Total Timed Treatment: 49 min  Total Treatment time: 50 min    Medication Changes since last visit?: No    Subjective:   Terrell report that she worked a lot at home yesterday (home chores/cleaning and cooking) and felt pain/ache in the lower back.  She only did her HEP (TARSHA) once yesterday which helped.  Today she feels better, there is no pain/ache in the lower back.  Objective:   ROM: (Pre-test symptom: 0/10 ache/pain (R) lower back and leg)  Lumbar        Movement Loss Pain (+/-)   Flexion Nil loss                     NE   Extension Nil loss NE    R Sideglide Nil loss NE   L Sideglide Nil loss NE     Treatment:  Date: 12/10/2024  Tx#: 2/5 Date: 12/12/2024  Tx#: 3/5 Date: 12/19/2024  Tx#: 4/5 Date: 1/7/2025  Tx#: 5/5   Scifit UE only L3 fwd/bkwd x 4 mins ea; NE back (tired breathing)    TARSHA x 10 reps; NE    + lean over rail x 10 reps; NE (increased extension to nil loss)    Sitting posture correction with lumbar with lumbar roll    C/s retraction x 10 reps; NE    + self OP x 10 reps; NE    Pronelying x 1 min; NE    Prone: Alt LE lift x 10 reps; P, NW (R) back    Prone: (B) UE extension 10 reps x 10 cts ea; NE    Prone: (B) UE h.abduction 3 reps x 10 cts; NE back; P, NW (R) shoulder ache (discontinued    Prone: back extension 10 reps x 10 cts; NE    Hydrant: (R/L) LE 1 kg ball 5 reps x 10 cts ea; NE     TARSHA over rail x 10 reps; NE Scifit UE only L3 fwd/bkwd x 4 mins ea; NE     TARSHA lean over rail x 10 reps; NE     Sitting posture  correction with lumbar roll    C/s retraction with self OP x 10 reps; NE (need to correct technique)     + manual OP/guide x 5 reps; P, NW mid lower neck    Repeat c/s retraction with self OP x 10 reps; NE (better technique)    Prone: Alt LE lift 2 lbs x 10 reps; P, NW (L) back weakness    Prone: (B) UE extension 2 lbs 10 reps x 10 cts ea; NE    Prone: (B) UE h.abduction 1 lb 10 reps x 10 cts; NE     Prone: (B) UE scaption (W position) 10 reps x 5 cts ea; NE    Prone: back extension (skydiver) 7 reps x 10 cts; NE    Hydrant: (R/L) LE 1 kg ball 5 reps x 10 cts ea; NE     Monster walk with redTB abduction resist 3 laps x 20 feet; NE tired    TARSHA over rail x 10 reps; NE Scifit UE only L3 fwd/bkwd x 4 mins ea; NE     TARSHA lean over rail x 10 reps; NE     (B) UE n.row, extension, w.row greenTB 10 reps x 10 cts ea; NE     Hydrant: (R/L) LE 1 kg ball 5 reps x 10 cts ea; NE     Monster walk with redTB abduction resist 3 laps x 20 feet; NE tired    TARSHA over rail x 10 reps; NE Scifit UE only L3 fwd/bkwd x 4 mins ea; NE     TARSHA lean over rail x 10 reps; NE     (B) UE n.row, extension, w.row greenTB 10 reps x 10 cts ea; NE     Hydrant: (R/L) LE 1 kg ball 5 reps x 10 cts ea; NE     Monster walk with redTB abduction resist 3 laps x 20 feet; NE tired    TARSHA over rail x 10 reps; NE     Date: 1/10/2025  Tx#: 6/9       Scifit UE only L5 fwd/bkwd x 5 mins ea; NE    Sustained lumbar extension in pronelying x 3+3 mins; NE    Prone: Alt LE lift 3 lbs 2 x 10 reps; NE    Standing: (B) UE rhytmic-stab extension blueTB 10 sets x 10 bounces ea; NE tired    Monster walk fwd/bkwd with greenTB abduction resist 3 laps x 30 feet; NE    Hydrant (R/L) LE 1 kg ball 5 reps x 10 cts ea; NE tired    Step up forward/straddle with greenTB abduction resist (R/L) LE x 10 reps ea; NE tired    TARSHA over rail x 10 reps; NE         Assessment:   Terrell was reminded to continue to do her back extension exercises to maintain her improved condition.  She was  reminded to do pronelying on elbows after doing any prolonged activities at home.  All questions and concerns were answered and addressed at this time.      HEP:    - TARSHA over rail/counter/table x 10 reps 2-3x/day  - Seated: c/s extension lean back on chair x 10 reps 2-3x/day  - Sitting posture correction with lumbar roll  - (B) Scapula/upper back strengthening (issued)    Goals:   (5 visits)  1. Patient to consistently perform her HEP and it's progression to maintain her improved condition.   2. Patient to have reduced, centralized, and abolished symptoms in the low back to enable easier ADLs, functional, work, and recreational activities.   3. Patient to have WNL of lumbar mobility in all directions to be able to bend, sit, rise up from sitting, stand > walk, get in/out of their vehicle, pick something form the floor, and stoop forward to do the dishes.     Plan:   Re-assess next session and continue with directional preference exercise, posture correction, postural strengthening, patient education, and HEP progression.

## 2025-01-14 ENCOUNTER — APPOINTMENT (OUTPATIENT)
Dept: PHYSICAL THERAPY | Age: 70
End: 2025-01-14
Attending: INTERNAL MEDICINE
Payer: COMMERCIAL

## 2025-01-16 ENCOUNTER — APPOINTMENT (OUTPATIENT)
Dept: PHYSICAL THERAPY | Age: 70
End: 2025-01-16
Attending: INTERNAL MEDICINE
Payer: COMMERCIAL

## 2025-01-21 ENCOUNTER — APPOINTMENT (OUTPATIENT)
Dept: PHYSICAL THERAPY | Age: 70
End: 2025-01-21
Attending: INTERNAL MEDICINE
Payer: COMMERCIAL

## 2025-01-30 ENCOUNTER — APPOINTMENT (OUTPATIENT)
Dept: PHYSICAL THERAPY | Age: 70
End: 2025-01-30
Attending: INTERNAL MEDICINE
Payer: COMMERCIAL

## 2025-02-05 ENCOUNTER — APPOINTMENT (OUTPATIENT)
Dept: PHYSICAL THERAPY | Age: 70
End: 2025-02-05
Attending: INTERNAL MEDICINE
Payer: COMMERCIAL

## 2025-02-06 RX ORDER — VIT C/E/ZN/COPPR/LUTEIN/ZEAXAN 250MG-90MG
5000 CAPSULE ORAL DAILY
Qty: 90 TABLET | Refills: 0 | OUTPATIENT
Start: 2025-02-06

## 2025-02-06 NOTE — TELEPHONE ENCOUNTER
Future Appt. NO FUTURE APPT.     Last Visit: 08/13/2024    Medication Requested:  Requested Prescriptions     Pending Prescriptions Disp Refills    FT VITAMIN D3 125 MCG (5000 UT) Oral Tab [Pharmacy Med Name: Ft Vitamin D3 125 Mcg Tab Stra] 90 tablet 0     Sig: Take 1 tablet (5,000 Units total) by mouth daily.      Last refill:        Disp Refills Start End     cholecalciferol 125 MCG (5000 UT) Oral Tab 90 tablet 1 8/19/2024 11/17/2024    Sig - Route: Take 1 tablet (5,000 Units total) by mouth daily. - Oral

## 2025-02-07 ENCOUNTER — APPOINTMENT (OUTPATIENT)
Dept: PHYSICAL THERAPY | Age: 70
End: 2025-02-07
Attending: INTERNAL MEDICINE
Payer: COMMERCIAL

## 2025-04-07 RX ORDER — METHYLPREDNISOLONE 4 MG/1
TABLET ORAL
Qty: 21 EACH | Refills: 0 | Status: SHIPPED | OUTPATIENT
Start: 2025-04-07

## 2025-04-11 RX ORDER — RANOLAZINE 500 MG/1
500 TABLET, EXTENDED RELEASE ORAL 2 TIMES DAILY
COMMUNITY

## 2025-04-11 RX ORDER — BISOPROLOL FUMARATE 5 MG/1
5 TABLET, FILM COATED ORAL DAILY
COMMUNITY

## 2025-04-11 RX ORDER — CARBAMAZEPINE 100 MG/1
100 TABLET, EXTENDED RELEASE ORAL DAILY
COMMUNITY
Start: 2025-02-05

## 2025-04-11 RX ORDER — ROSUVASTATIN CALCIUM 20 MG/1
20 TABLET, COATED ORAL DAILY
COMMUNITY
Start: 2025-02-05

## 2025-04-11 RX ORDER — CLOPIDOGREL BISULFATE 75 MG/1
75 TABLET ORAL DAILY
COMMUNITY
Start: 2025-02-05

## 2025-04-11 RX ORDER — NITROGLYCERIN 0.4 MG/1
1 TABLET SUBLINGUAL AS NEEDED
COMMUNITY
Start: 2024-11-16

## 2025-04-11 RX ORDER — ASPIRIN 81 MG/1
81 TABLET, COATED ORAL DAILY
COMMUNITY
Start: 2025-02-05

## 2025-04-11 RX ORDER — MONTELUKAST SODIUM 10 MG/1
10 TABLET ORAL NIGHTLY
COMMUNITY
Start: 2025-02-05

## 2025-04-18 ENCOUNTER — HOSPITAL ENCOUNTER (OUTPATIENT)
Facility: HOSPITAL | Age: 70
Setting detail: HOSPITAL OUTPATIENT SURGERY
Discharge: HOME | End: 2025-04-18
Attending: INTERNAL MEDICINE | Admitting: INTERNAL MEDICINE
Payer: COMMERCIAL

## 2025-04-18 ENCOUNTER — ANESTHESIA (OUTPATIENT)
Dept: ENDOSCOPY | Facility: HOSPITAL | Age: 70
End: 2025-04-18
Payer: COMMERCIAL

## 2025-04-18 ENCOUNTER — ANESTHESIA EVENT (OUTPATIENT)
Dept: ENDOSCOPY | Facility: HOSPITAL | Age: 70
End: 2025-04-18
Payer: COMMERCIAL

## 2025-04-18 VITALS
WEIGHT: 149 LBS | OXYGEN SATURATION: 94 % | RESPIRATION RATE: 17 BRPM | HEART RATE: 71 BPM | DIASTOLIC BLOOD PRESSURE: 99 MMHG | BODY MASS INDEX: 29.25 KG/M2 | SYSTOLIC BLOOD PRESSURE: 114 MMHG | HEIGHT: 60 IN

## 2025-04-18 DIAGNOSIS — Z80.0 FAMILY HISTORY OF COLON CANCER: ICD-10-CM

## 2025-04-18 DIAGNOSIS — Z12.11 SCREEN FOR COLON CANCER: ICD-10-CM

## 2025-04-18 DIAGNOSIS — K52.839 MICROSCOPIC COLITIS, UNSPECIFIED MICROSCOPIC COLITIS TYPE: ICD-10-CM

## 2025-04-18 PROBLEM — K64.8 INTERNAL HEMORRHOIDS: Status: ACTIVE | Noted: 2025-04-18

## 2025-04-18 PROCEDURE — 45385 COLONOSCOPY W/LESION REMOVAL: CPT | Performed by: INTERNAL MEDICINE

## 2025-04-18 PROCEDURE — 0DBN8ZX EXCISION OF SIGMOID COLON, VIA NATURAL OR ARTIFICIAL OPENING ENDOSCOPIC, DIAGNOSTIC: ICD-10-PCS | Performed by: INTERNAL MEDICINE

## 2025-04-18 RX ORDER — NALOXONE HYDROCHLORIDE 0.4 MG/ML
0.08 INJECTION, SOLUTION INTRAMUSCULAR; INTRAVENOUS; SUBCUTANEOUS ONCE AS NEEDED
Status: DISCONTINUED | OUTPATIENT
Start: 2025-04-18 | End: 2025-04-18

## 2025-04-18 RX ORDER — SODIUM CHLORIDE, SODIUM LACTATE, POTASSIUM CHLORIDE, CALCIUM CHLORIDE 600; 310; 30; 20 MG/100ML; MG/100ML; MG/100ML; MG/100ML
INJECTION, SOLUTION INTRAVENOUS CONTINUOUS
Status: DISCONTINUED | OUTPATIENT
Start: 2025-04-18 | End: 2025-04-18

## 2025-04-18 RX ORDER — LIDOCAINE HYDROCHLORIDE 10 MG/ML
INJECTION, SOLUTION EPIDURAL; INFILTRATION; INTRACAUDAL; PERINEURAL AS NEEDED
Status: DISCONTINUED | OUTPATIENT
Start: 2025-04-18 | End: 2025-04-18 | Stop reason: SURG

## 2025-04-18 RX ADMIN — SODIUM CHLORIDE, SODIUM LACTATE, POTASSIUM CHLORIDE, CALCIUM CHLORIDE: 600; 310; 30; 20 INJECTION, SOLUTION INTRAVENOUS at 11:27:00

## 2025-04-18 RX ADMIN — LIDOCAINE HYDROCHLORIDE 20 MG: 10 INJECTION, SOLUTION EPIDURAL; INFILTRATION; INTRACAUDAL; PERINEURAL at 11:30:00

## 2025-04-18 RX ADMIN — SODIUM CHLORIDE, SODIUM LACTATE, POTASSIUM CHLORIDE, CALCIUM CHLORIDE: 600; 310; 30; 20 INJECTION, SOLUTION INTRAVENOUS at 12:04:00

## 2025-04-18 NOTE — ANESTHESIA POSTPROCEDURE EVALUATION
Patient: Terrell Heard    Procedure Summary       Date: 04/18/25 Room / Location: [facility] ENDOSCOPY 05 / [facility] ENDOSCOPY    Anesthesia Start: 1127 Anesthesia Stop: 1207    Procedure: COLONOSCOPY Diagnosis:       Microscopic colitis, unspecified microscopic colitis type      Family history of colon cancer      Screen for colon cancer      (Polyp, diverticulosis, hemorrhoids)    Surgeons: Anthony Dodge MD Anesthesiologist: Lia Saleem CRNA    Anesthesia Type: MAC ASA Status: 2            Anesthesia Type: MAC    Vitals Value Taken Time   /99 04/18/25 12:25   Pulse 71 04/18/25 12:29   Resp 16 04/18/25 12:29   SpO2 96 % 04/18/25 12:29   Vitals shown include unfiled device data.    [facility] AN Post Evaluation:   Patient Evaluated in PACU  Patient Participation: complete - patient participated  Level of Consciousness: awake  Pain Score: 0  Pain Management: adequate  Airway Patency:patent  Dental exam unchanged from preop  Yes    Nausea/Vomiting: none  Cardiovascular Status: acceptable  Respiratory Status: acceptable  Postoperative Hydration acceptable      Lia Saleem CRNA  4/18/2025 1:00 PM

## 2025-04-18 NOTE — ANESTHESIA PREPROCEDURE EVALUATION
Anesthesia PreOp Note    HPI:     Terrell Heard is a 69 year old female who presents for preoperative consultation requested by: Anthony Dodge MD    Date of Surgery: 4/18/2025    Procedure(s):  COLONOSCOPY  Indication: Microscopic colitis, unspecified microscopic colitis type/ Family history of colon cancer / Screen for colon cancer    Relevant Problems   No relevant active problems       NPO:                         History Review:  There are no active problems to display for this patient.      Past Medical History[1]    Past Surgical History[2]    Prescriptions Prior to Admission[3]  Current Medications and Prescriptions Ordered in Epic[4]    Allergies[5]    Family History[6]  Social Hx on file[7]    Available pre-op labs reviewed.             Vital Signs:  Body mass index is 29.1 kg/m².   height is 1.524 m (5') and weight is 67.6 kg (149 lb).   Vitals:    04/11/25 0949   Weight: 67.6 kg (149 lb)   Height: 1.524 m (5')        Anesthesia Evaluation     Patient summary reviewed and Nursing notes reviewed    Airway   Mallampati: II  Dental      Pulmonary    (+) asthma    ROS comment: Pt took two puffs of albuterol preop  Cardiovascular   Exercise tolerance: good    Neuro/Psych      GI/Hepatic/Renal    (+) bowel prep    Endo/Other    Abdominal                  Anesthesia Plan:   ASA:  2  Plan:   MAC  Informed Consent Plan and Risks Discussed With:  Patient      I have informed Terrell Heard and/or legal guardian or family member of the nature of the anesthetic plan, benefits, risks including possible dental damage if relevant, major complications, and any alternative forms of anesthetic management.   All of the patient's questions were answered to the best of my ability. The patient desires the anesthetic management as planned.  Lia Saleem CRNA  4/18/2025 9:11 AM  Present on Admission:  **None**           [1]   Past Medical History:   Asthma (HCC)    Colitis    Stable angina pectoris     Trigeminal neuralgia    Visual impairment    glasses   [2]   Past Surgical History:  Procedure Laterality Date    Angioplasty (coronary)  2024   [3]   No medications prior to admission.   [4]   No current Epic-ordered facility-administered medications on file.     Current Outpatient Medications Ordered in Epic   Medication Sig Dispense Refill    ASPIRIN EC ADULT LOW DOSE 81 MG Oral Tab EC Take 1 tablet (81 mg total) by mouth daily.      carBAMazepine  MG Oral Tablet 12 Hr Take 1 tablet (100 mg total) by mouth daily.      clopidogrel 75 MG Oral Tab Take 1 tablet (75 mg total) by mouth daily.      montelukast 10 MG Oral Tab Take 1 tablet (10 mg total) by mouth nightly.      nitroglycerin 0.4 MG Sublingual SL Tab Place 1 tablet (0.4 mg total) under the tongue as needed.      rosuvastatin 20 MG Oral Tab Take 1 tablet (20 mg total) by mouth daily.      albuterol 108 (90 Base) MCG/ACT Inhalation Aero Soln Inhale 2 puffs into the lungs every 6 (six) hours as needed for Wheezing or Shortness of Breath. 18 g 0    GLUCOSAMINE-VITAMIN D OR       bisoprolol 5 MG Oral Tab Take 1 tablet (5 mg total) by mouth daily.      ranolazine  MG Oral Tablet 12 Hr Take 1 tablet (500 mg total) by mouth 2 (two) times daily.      methylPREDNISolone (MEDROL) 4 MG Oral Tablet Therapy Pack As directed. (Patient not taking: Reported on 4/11/2025) 21 each 0    Na Sulfate-K Sulfate-Mg Sulf (SUPREP BOWEL PREP KIT) 17.5-3.13-1.6 GM/177ML Oral Solution Take as directed (Patient not taking: Reported on 4/11/2025) 1 each 0   [5] No Known Allergies  [6]   Family History  Problem Relation Age of Onset    Stroke Father     Hypertension Father     Diabetes Mother     Cancer Sister         colon cancer   [7]   Social History  Socioeconomic History    Marital status:    Tobacco Use    Smoking status: Never    Smokeless tobacco: Never   Vaping Use    Vaping status: Never Used   Substance and Sexual Activity    Alcohol use: Never    Drug use:  Never

## 2025-04-18 NOTE — DISCHARGE INSTRUCTIONS
.  .  .  Notes from Dr Dodge:  Medium severity \"diverticulosis\" (pockets) of the wall of the colon. You should be provided a handout regarding diet and the possible risk of diverticulitis/infection  One small (5 mm) benign colon polyp was found and removed today - to be sent to the lab to be examined - a letter will be sent with results.  You may see small quantities of blood with your next 1-2 bowel movements today.    If you check your results on [employer], the \"pathology\" report on the colon polyp(s) should be released within the next 24-48 hours.  In general, a \"hyperplastic\" colon polyp is completely benign, vs an \"adenoma\" or \"sessile serrated adenoma\" which is considered a benign but precancerous colon polyp.  That will be explained in a [employer] message from me as well.  Medium sized internal hemorrhoids - very common  No aspirin, Excedrin, Advil/Motrin/ibuprofen, Aleve/naproxen for next 5 days (to prevent bleeding.)  I recommend resuming the Plavix clopidogrel anticoagulation on Sunday, 4/20/2025    If you are raw and irritated back there after all of that diarrhea and wiping, three good options to soothe and heal the irritation include Aquaphor ointment, \"Desitin\" or \"A & D\" diaper ointment, or good old-fashioned Vaseline.  All of these soothe and create a protective barrier so that your skin can heal.  I recommend repeat colonoscopy exam in 5 years.  .  .  . Home Care Instructions for Colonoscopy with Sedation    Diet:  - Resume your regular diet as tolerated unless otherwise instructed.  - Start with light meals to minimize bloating.  - Do not drink alcohol today.    Medication:  - If you have questions about resuming your normal medications, please contact your Primary Care Physician.    Activities:  - Take it easy today. Do not return to work today.  - Do not drive today.  - Do not operate any machinery today (including kitchen equipment).    Colonoscopy:  - You may notice some rectal \"spotting\" (a  little blood on the toilet tissue) for a day or two after the exam. This is normal.  - If you experience any rectal bleeding (not spotting), persistent tenderness or sharp severe abdominal pains, oral temperature over 100 degrees Fahrenheit, light-headedness or dizziness, or any other problems, contact your doctor.    **If unable to reach your doctor, please go to the [facility] Emergency Room**    - Your referring physician will receive a full report of your examination.  - If you do not hear from your doctor's office within two weeks of your biopsy, please call them for your results.    You may be able to see your laboratory results in [employer] between 4 and 7 business days.  In some cases, your physician may not have viewed the results before they are released to [employer].  If you have questions regarding your results contact the physician who ordered the test/exam by phone or via [employer] by choosing \"Ask a Medical Question.\"

## 2025-04-18 NOTE — OPERATIVE REPORT
[facility]    COLONOSCOPY PROCEDURE REPORT     DATE OF PROCEDURE:  4/18/2025     PCP: Alexa Porter MD     PREOPERATIVE DIAGNOSIS:  screening colonoscopy examination, history of microscopic colitis, family history colon cancer in a sibling      POSTOPERATIVE DIAGNOSIS:  See impression.     SURGEON:  Anthony Dodge M.D.     SEDATION:    MAC anesthesia provided by the Anesthesia Service.  MAC anesthesia requested due to age 69, anticipated intolerance of colonoscopy examination under safe doses conscious sedation medications       COLONOSCOPY PROCEDURE:   After the nature and risks of colonoscopy examination under MAC anesthesia were discussed with the patient and all questions answered, informed consent was obtained.  The patient was sedated as above.      Digital rectal exam was performed which showed no masses.  The Olympus pediatric video colonoscope was placed in the patient's rectum and advanced under direct visualization through the entire length of the colon up to the cecum.  Retroflex exam performed up the ascending colon to the hepatic flexure.  The cecum was confirmed by landmarks including appendiceal orifice, cecal trifold, ileocecal valve.  Retroflexion was performed in the rectum.    The quality of the prep was excellent.    Estimated blood loss: none/insignificant       COLONOSCOPY FINDINGS:    No inflammatory process seen anywhere in the colonic mucosa from anal verge up to the base of the cecum today.  Biopsies deferred today per discussion with family prior to the procedure as she has done so well past few years.  1 sessile 5 mm colon polyp found and removed from the mid sigmoid colon by cold snare polypectomy technique, suctioned out.  Medium severity pancolonic diverticulosis most numerous in the descending and sigmoid colon.  Medium sized internal hemorrhoids.    RECOMMENDATIONS:  High fiber diet.  Follow-up above colon polyp pathology results.  Repeat colonoscopy  examination in 5 years as per clinical condition at that time.  No aspirin or NSAID medications for next 5 days to prevent bleeding.  Resume Plavix anticoagulation on 4/20/2025.

## 2025-04-18 NOTE — H&P
History & Physical Examination    Patient Name: Terrell Heard  MRN: [id number]  [facility]: [id number]  YOB: 1955    Diagnosis: screening colonoscopy examination, history of microscopic colitis, family history colon cancer in a sibling     PRESENT ILLNESS: 69-year-old woman with BMI 29, heart disease dyslipidemia previous concern for microscopic colitis and family history of colon cancer in a sibling.  Previous colonoscopy examination was apparently 2021, showing microscopic colitis.  Diarrhea from that time is much improved.      BMI Readings from Last 1 Encounters:   04/11/25 29.10 kg/m²         Prescriptions Prior to Admission[1]  Current Hospital Medications[2]    Allergies: Allergies[3]    Past Medical History[4]  Past Surgical History[5]  Family History[6]  Social History     Tobacco Use    Smoking status: Never    Smokeless tobacco: Never   Substance Use Topics    Alcohol use: Never       SYSTEM Check if Review is Normal Check if Physical Exam is Normal If not normal, please explain:   HEENT [ ] [X ]    NECK & BACK [ ] [ ]    HEART [ X ] [ X ]    LUNGS [ X ] [ X ]    ABDOMEN [ X ] [ X ]    UROGENITAL [ ] [ ]    EXTREMITIES [ ] [ ]    OTHER        See Anesthesia documentation    [ x ] I have discussed the risks and benefits and alternatives with the patient/family.  They understand and agree to proceed with plan of care.  [ x ] I have reviewed the History and Physical done within the last 30 days.  Any changes noted above.    Anthony Dodge MD  4/18/2025  11:12 AM             [1]   Medications Prior to Admission   Medication Sig Dispense Refill Last Dose/Taking    ASPIRIN EC ADULT LOW DOSE 81 MG Oral Tab EC Take 1 tablet (81 mg total) by mouth daily.   4/14/2025    carBAMazepine  MG Oral Tablet 12 Hr Take 1 tablet (100 mg total) by mouth daily.   4/17/2025    clopidogrel 75 MG Oral Tab Take 1 tablet (75 mg total) by mouth daily.   4/11/2025    montelukast 10 MG Oral Tab Take 1  tablet (10 mg total) by mouth nightly.   2025    rosuvastatin 20 MG Oral Tab Take 1 tablet (20 mg total) by mouth daily.   2025    [] fluticasone-umeclidin-vilant (TRELEGY ELLIPTA) 200-62.5-25 MCG/ACT Inhalation Aerosol Powder, Breath Activated Inhale 1 puff into the lungs daily. 180 each 1 Taking    albuterol 108 (90 Base) MCG/ACT Inhalation Aero Soln Inhale 2 puffs into the lungs every 6 (six) hours as needed for Wheezing or Shortness of Breath. 18 g 0 2025    GLUCOSAMINE-VITAMIN D OR    2025    bisoprolol 5 MG Oral Tab Take 1 tablet (5 mg total) by mouth daily.   2025    nitroglycerin 0.4 MG Sublingual SL Tab Place 1 tablet (0.4 mg total) under the tongue as needed.       ranolazine  MG Oral Tablet 12 Hr Take 1 tablet (500 mg total) by mouth 2 (two) times daily.       [] amoxicillin clavulanate 875-125 MG Oral Tab Take 1 tablet by mouth 2 (two) times daily for 10 days. (Patient not taking: Reported on 2025) 20 tablet 0 Not Taking    methylPREDNISolone (MEDROL) 4 MG Oral Tablet Therapy Pack As directed. (Patient not taking: Reported on 2025) 21 each 0 Not Taking    Na Sulfate-K Sulfate-Mg Sulf (SUPREP BOWEL PREP KIT) 17.5-3.13-1.6 GM/177ML Oral Solution Take as directed (Patient not taking: Reported on 2025) 1 each 0 Not Taking   [2]   Current Facility-Administered Medications   Medication Dose Route Frequency    lactated ringers infusion   Intravenous Continuous   [3] No Known Allergies  [4]   Past Medical History:   Asthma (HCC)    Colitis    Stable angina pectoris    Trigeminal neuralgia    Visual impairment    glasses   [5]   Past Surgical History:  Procedure Laterality Date    Angioplasty (coronary)     [6]   Family History  Problem Relation Age of Onset    Stroke Father     Hypertension Father     Diabetes Mother     Cancer Sister         colon cancer

## 2025-05-06 ENCOUNTER — TELEPHONE (OUTPATIENT)
Facility: CLINIC | Age: 70
End: 2025-05-06

## 2025-05-07 NOTE — TELEPHONE ENCOUNTER
----- Message from Anthony Dodge sent at 5/4/2025  5:19 PM CDT -----  GI RNs - please recall for colonoscopy exam in 5yrs     ----- Message -----  From: Lab, Background User  Sent: 4/19/2025  10:47 AM CDT  To: Anthony Dodge MD

## 2025-05-08 NOTE — TELEPHONE ENCOUNTER
Health maintenance updated and message sent to pt outreach to repeat colonoscopy in 5 years    Recall colon in 5 years per Dr Dodge. Colon done 04/18/2025

## 2025-08-06 DIAGNOSIS — Z00.00 ANNUAL PHYSICAL EXAM: ICD-10-CM

## 2025-08-06 RX ORDER — MONTELUKAST SODIUM 10 MG/1
10 TABLET ORAL NIGHTLY
Refills: 0 | Status: CANCELLED | OUTPATIENT
Start: 2025-08-06

## 2025-08-06 RX ORDER — ROSUVASTATIN CALCIUM 20 MG/1
20 TABLET, COATED ORAL DAILY
Refills: 0 | Status: CANCELLED | OUTPATIENT
Start: 2025-08-06

## 2025-08-06 RX ORDER — CLOPIDOGREL BISULFATE 75 MG/1
75 TABLET ORAL DAILY
Refills: 0 | Status: CANCELLED | OUTPATIENT
Start: 2025-08-06

## 2025-08-07 RX ORDER — CLOPIDOGREL BISULFATE 75 MG/1
75 TABLET ORAL DAILY
Qty: 90 TABLET | Refills: 0 | OUTPATIENT
Start: 2025-08-07

## 2025-08-07 RX ORDER — CARBAMAZEPINE 100 MG/1
100 TABLET, EXTENDED RELEASE ORAL DAILY
Refills: 0 | Status: CANCELLED | OUTPATIENT
Start: 2025-08-07

## 2025-08-07 RX ORDER — ALBUTEROL SULFATE 90 UG/1
2 AEROSOL, METERED RESPIRATORY (INHALATION) EVERY 6 HOURS PRN
Qty: 18 G | Refills: 0 | OUTPATIENT
Start: 2025-08-07

## 2025-08-07 RX ORDER — CARBAMAZEPINE 100 MG/1
100 TABLET, EXTENDED RELEASE ORAL DAILY
Qty: 90 TABLET | Refills: 0 | OUTPATIENT
Start: 2025-08-07

## 2025-08-07 RX ORDER — ROSUVASTATIN CALCIUM 20 MG/1
20 TABLET, COATED ORAL DAILY
Qty: 90 TABLET | Refills: 0 | Status: SHIPPED | OUTPATIENT
Start: 2025-08-07

## 2025-08-08 RX ORDER — CLOPIDOGREL BISULFATE 75 MG/1
75 TABLET ORAL DAILY
Qty: 90 TABLET | Refills: 3 | Status: SHIPPED | OUTPATIENT
Start: 2025-08-08

## 2025-08-08 RX ORDER — CARBAMAZEPINE 100 MG/1
100 TABLET, EXTENDED RELEASE ORAL DAILY
Qty: 90 TABLET | Refills: 0 | Status: SHIPPED | OUTPATIENT
Start: 2025-08-08

## 2025-08-08 RX ORDER — NITROGLYCERIN 0.4 MG/1
0.4 TABLET SUBLINGUAL AS NEEDED
Qty: 8 TABLET | Refills: 1 | Status: SHIPPED | OUTPATIENT
Start: 2025-08-08

## 2025-08-08 RX ORDER — ALBUTEROL SULFATE 90 UG/1
2 INHALANT RESPIRATORY (INHALATION) EVERY 6 HOURS PRN
Qty: 3 EACH | Refills: 0 | Status: SHIPPED | OUTPATIENT
Start: 2025-08-08

## 2025-08-08 RX ORDER — RANOLAZINE 500 MG/1
500 TABLET, EXTENDED RELEASE ORAL 2 TIMES DAILY
Qty: 180 TABLET | Refills: 0 | Status: SHIPPED | OUTPATIENT
Start: 2025-08-08

## 2025-08-08 RX ORDER — BISOPROLOL FUMARATE 5 MG/1
5 TABLET, FILM COATED ORAL DAILY
Qty: 90 TABLET | Refills: 3 | Status: SHIPPED | OUTPATIENT
Start: 2025-08-08

## 2025-08-08 RX ORDER — MONTELUKAST SODIUM 10 MG/1
10 TABLET ORAL NIGHTLY
Qty: 90 TABLET | Refills: 3 | Status: SHIPPED | OUTPATIENT
Start: 2025-08-08

## (undated) DEVICE — STERILE LATEX POWDER-FREE SURGICAL GLOVESWITH NITRILE COATING: Brand: PROTEXIS

## (undated) DEVICE — KIT CLEAN ENDOKIT 1.1OZ GOWNX2

## (undated) DEVICE — V2 SPECIMEN COLLECTION TRAY: Brand: NEPTUNE

## (undated) DEVICE — KIT ENDO ORCAPOD 160/180/190

## (undated) DEVICE — V2 SPECIMEN COLLECTION MANIFOLD KIT: Brand: NEPTUNE

## (undated) DEVICE — LASSO POLYPECTOMY SNARE: Brand: LASSO

## (undated) DEVICE — MEDI-VAC NON-CONDUCTIVE SUCTION TUBING 6MM X 1.8M (6FT.) L: Brand: CARDINAL HEALTH

## (undated) DEVICE — 60 ML SYRINGE REGULAR TIP: Brand: MONOJECT

## (undated) DEVICE — Device

## (undated) NOTE — LETTER
[facility]  Administration of Anesthesia  Terrell GIPSON agree to be cared for by a physician anesthesiologist alone and/or with a nurse anesthetist, who is specially trained to monitor me and give me medicine to put me to sleep or keep me comfortable during my procedure    I understand that my anesthesiologist and/or anesthetist is not an employee or agent of [facility] or [employer]. He or she works for [facility]    As the patient asking for anesthesia services, I agree to:  Allow the anesthesiologist (anesthesia doctor) to give me medicine and do additional procedures as necessary. Some examples are: Starting or using an “IV” to give me medicine, fluids or blood during my procedure, and having a breathing tube placed to help me breathe when I’m asleep (intubation). In the event that my heart stops working properly, I understand that my anesthesiologist will make every effort to sustain my life, unless otherwise directed by [facility] Do Not Resuscitate documents.  Tell my anesthesia doctor before my procedure:  If I am pregnant.  The last time that I ate or drank.  iii. All of the medicines I take (including prescriptions, herbal supplements, and pills I can buy without a prescription (including street drugs/illegal medications). Failure to inform my anesthesiologist about these medicines may increase my risk of anesthetic complications.  iv.If I am allergic to anything or have had a reaction to anesthesia before.  I understand how the anesthesia medicine will help me (benefits).  I understand that with any type of anesthesia medicine there are risks:  The most common risks are: nausea, vomiting, sore throat, muscle soreness, damage to my eyes, mouth, or teeth (from breathing tube placement).  Rare risks include: remembering what happened during my procedure, allergic reactions to medications, injury to my airway, heart, lungs, vision, nerves, or  muscles and in extremely rare instances death.  My doctor has explained to me other choices available to me for my care (alternatives).  Pregnant Patients (“epidural”):  I understand that the risks of having an epidural (medicine given into my back to help control pain during labor), include itching, low blood pressure, difficulty urinating, headache or slowing of the baby’s heart. Very rare risks include infection, bleeding, seizure, irregular heart rhythms and nerve injury.  Regional Anesthesia (“spinal”, “epidural”, & “nerve blocks”):  I understand that rare but potential complications include headache, bleeding, infection, seizure, irregular heart rhythms, and nerve injury.    _____________________________________________________________________________  Patient (or Representative) Signature/Relationship to Patient  Date   Time    _____________________________________________________________________________   Name (if used)    Language/Organization   Time    _____________________________________________________________________________  Nurse Anesthetist Signature     Date   Time  _____________________________________________________________________________  Anesthesiologist Signature     Date   Time  I have discussed the procedure and information above with the patient (or patient’s representative) and answered their questions. The patient or their representative has agreed to have anesthesia services.    _____________________________________________________________________________  Witness        Date   Time  I have verified that the signature is that of the patient or patient’s representative, and that it was signed before the procedure  Patient Name: Terrell Heard     : 1955                 Printed: 2025 at 5:02 PM    Medical Record #: [id number]                                            Page 1 of 1  ----------ANESTHESIA CONSENT----------